# Patient Record
Sex: FEMALE | Race: WHITE | NOT HISPANIC OR LATINO | Employment: FULL TIME | URBAN - METROPOLITAN AREA
[De-identification: names, ages, dates, MRNs, and addresses within clinical notes are randomized per-mention and may not be internally consistent; named-entity substitution may affect disease eponyms.]

---

## 2017-04-19 ENCOUNTER — COMMUNICATION - HEALTHEAST (OUTPATIENT)
Dept: FAMILY MEDICINE | Facility: CLINIC | Age: 42
End: 2017-04-19

## 2017-04-20 ENCOUNTER — COMMUNICATION - HEALTHEAST (OUTPATIENT)
Dept: SCHEDULING | Facility: CLINIC | Age: 42
End: 2017-04-20

## 2017-04-21 ENCOUNTER — OFFICE VISIT - HEALTHEAST (OUTPATIENT)
Dept: FAMILY MEDICINE | Facility: CLINIC | Age: 42
End: 2017-04-21

## 2017-04-21 DIAGNOSIS — F41.9 ANXIETY: ICD-10-CM

## 2017-04-21 DIAGNOSIS — F41.1 ANXIETY STATE: ICD-10-CM

## 2017-04-21 DIAGNOSIS — G43.909 MIGRAINE: ICD-10-CM

## 2017-04-21 DIAGNOSIS — Z51.81 MEDICATION MONITORING ENCOUNTER: ICD-10-CM

## 2017-04-21 ASSESSMENT — MIFFLIN-ST. JEOR: SCORE: 1124.45

## 2017-04-23 ENCOUNTER — COMMUNICATION - HEALTHEAST (OUTPATIENT)
Dept: FAMILY MEDICINE | Facility: CLINIC | Age: 42
End: 2017-04-23

## 2017-07-20 ENCOUNTER — COMMUNICATION - HEALTHEAST (OUTPATIENT)
Dept: FAMILY MEDICINE | Facility: CLINIC | Age: 42
End: 2017-07-20

## 2017-07-20 DIAGNOSIS — G43.909 MIGRAINE: ICD-10-CM

## 2017-11-07 ENCOUNTER — COMMUNICATION - HEALTHEAST (OUTPATIENT)
Dept: FAMILY MEDICINE | Facility: CLINIC | Age: 42
End: 2017-11-07

## 2017-11-07 DIAGNOSIS — F41.9 ANXIETY: ICD-10-CM

## 2017-11-07 DIAGNOSIS — G43.909 MIGRAINE: ICD-10-CM

## 2018-06-22 ENCOUNTER — OFFICE VISIT - HEALTHEAST (OUTPATIENT)
Dept: FAMILY MEDICINE | Facility: CLINIC | Age: 43
End: 2018-06-22

## 2018-06-22 DIAGNOSIS — R19.7 DIARRHEA OF PRESUMED INFECTIOUS ORIGIN: ICD-10-CM

## 2018-06-22 LAB
ANION GAP SERPL CALCULATED.3IONS-SCNC: 11 MMOL/L (ref 5–18)
BASOPHILS # BLD AUTO: 0.1 THOU/UL (ref 0–0.2)
BASOPHILS NFR BLD AUTO: 1 % (ref 0–2)
BUN SERPL-MCNC: 9 MG/DL (ref 8–22)
CALCIUM SERPL-MCNC: 9 MG/DL (ref 8.5–10.5)
CHLORIDE BLD-SCNC: 106 MMOL/L (ref 98–107)
CO2 SERPL-SCNC: 22 MMOL/L (ref 22–31)
CREAT SERPL-MCNC: 0.73 MG/DL (ref 0.6–1.1)
EOSINOPHIL # BLD AUTO: 0.2 THOU/UL (ref 0–0.4)
EOSINOPHIL NFR BLD AUTO: 4 % (ref 0–6)
ERYTHROCYTE [DISTWIDTH] IN BLOOD BY AUTOMATED COUNT: 11.4 % (ref 11–14.5)
GFR SERPL CREATININE-BSD FRML MDRD: >60 ML/MIN/1.73M2
GLUCOSE BLD-MCNC: 90 MG/DL (ref 70–125)
HCT VFR BLD AUTO: 41.8 % (ref 35–47)
HGB BLD-MCNC: 14.1 G/DL (ref 12–16)
LYMPHOCYTES # BLD AUTO: 1.7 THOU/UL (ref 0.8–4.4)
LYMPHOCYTES NFR BLD AUTO: 37 % (ref 20–40)
MCH RBC QN AUTO: 32.6 PG (ref 27–34)
MCHC RBC AUTO-ENTMCNC: 33.7 G/DL (ref 32–36)
MCV RBC AUTO: 97 FL (ref 80–100)
MONOCYTES # BLD AUTO: 0.5 THOU/UL (ref 0–0.9)
MONOCYTES NFR BLD AUTO: 12 % (ref 2–10)
NEUTROPHILS # BLD AUTO: 2.1 THOU/UL (ref 2–7.7)
NEUTROPHILS NFR BLD AUTO: 47 % (ref 50–70)
PLATELET # BLD AUTO: 323 THOU/UL (ref 140–440)
PMV BLD AUTO: 6.8 FL (ref 7–10)
POTASSIUM BLD-SCNC: 3.8 MMOL/L (ref 3.5–5)
RBC # BLD AUTO: 4.32 MILL/UL (ref 3.8–5.4)
SODIUM SERPL-SCNC: 139 MMOL/L (ref 136–145)
WBC: 4.6 THOU/UL (ref 4–11)

## 2018-06-25 ENCOUNTER — AMBULATORY - HEALTHEAST (OUTPATIENT)
Dept: LAB | Facility: CLINIC | Age: 43
End: 2018-06-25

## 2018-06-25 DIAGNOSIS — R19.7 DIARRHEA OF PRESUMED INFECTIOUS ORIGIN: ICD-10-CM

## 2018-06-26 LAB
SHIGA TOXIN 1: NEGATIVE
SHIGA TOXIN 2: NEGATIVE

## 2018-06-27 ENCOUNTER — COMMUNICATION - HEALTHEAST (OUTPATIENT)
Dept: FAMILY MEDICINE | Facility: CLINIC | Age: 43
End: 2018-06-27

## 2018-06-28 LAB — BACTERIA SPEC CULT: NORMAL

## 2018-07-16 ENCOUNTER — COMMUNICATION - HEALTHEAST (OUTPATIENT)
Dept: FAMILY MEDICINE | Facility: CLINIC | Age: 43
End: 2018-07-16

## 2018-07-16 DIAGNOSIS — G43.909 MIGRAINE: ICD-10-CM

## 2018-09-21 ENCOUNTER — COMMUNICATION - HEALTHEAST (OUTPATIENT)
Dept: FAMILY MEDICINE | Facility: CLINIC | Age: 43
End: 2018-09-21

## 2018-09-21 DIAGNOSIS — G43.909 MIGRAINE: ICD-10-CM

## 2018-09-25 ENCOUNTER — OFFICE VISIT - HEALTHEAST (OUTPATIENT)
Dept: FAMILY MEDICINE | Facility: CLINIC | Age: 43
End: 2018-09-25

## 2018-09-25 DIAGNOSIS — Z12.31 VISIT FOR SCREENING MAMMOGRAM: ICD-10-CM

## 2018-09-25 DIAGNOSIS — L65.9 HAIR LOSS: ICD-10-CM

## 2018-09-25 DIAGNOSIS — G43.909 MIGRAINE: ICD-10-CM

## 2018-09-25 DIAGNOSIS — F41.9 ANXIETY: ICD-10-CM

## 2018-12-03 ENCOUNTER — HOSPITAL ENCOUNTER (OUTPATIENT)
Dept: MAMMOGRAPHY | Facility: CLINIC | Age: 43
Discharge: HOME | End: 2018-12-03
Attending: FAMILY MEDICINE

## 2018-12-03 DIAGNOSIS — Z12.31 VISIT FOR SCREENING MAMMOGRAM: ICD-10-CM

## 2019-01-09 ENCOUNTER — COMMUNICATION - HEALTHEAST (OUTPATIENT)
Dept: FAMILY MEDICINE | Facility: CLINIC | Age: 44
End: 2019-01-09

## 2019-03-18 ENCOUNTER — OFFICE VISIT - HEALTHEAST (OUTPATIENT)
Dept: FAMILY MEDICINE | Facility: CLINIC | Age: 44
End: 2019-03-18

## 2019-03-18 DIAGNOSIS — R05.9 COUGH: ICD-10-CM

## 2019-04-10 ENCOUNTER — COMMUNICATION - HEALTHEAST (OUTPATIENT)
Dept: FAMILY MEDICINE | Facility: CLINIC | Age: 44
End: 2019-04-10

## 2019-04-10 DIAGNOSIS — F41.9 ANXIETY: ICD-10-CM

## 2019-05-22 ENCOUNTER — COMMUNICATION - HEALTHEAST (OUTPATIENT)
Dept: FAMILY MEDICINE | Facility: CLINIC | Age: 44
End: 2019-05-22

## 2019-05-22 DIAGNOSIS — G43.909 MIGRAINE: ICD-10-CM

## 2019-07-03 ENCOUNTER — OFFICE VISIT - HEALTHEAST (OUTPATIENT)
Dept: FAMILY MEDICINE | Facility: CLINIC | Age: 44
End: 2019-07-03

## 2019-07-03 ENCOUNTER — COMMUNICATION - HEALTHEAST (OUTPATIENT)
Dept: FAMILY MEDICINE | Facility: CLINIC | Age: 44
End: 2019-07-03

## 2019-07-03 DIAGNOSIS — R39.9 URINARY SYMPTOM OR SIGN: ICD-10-CM

## 2019-07-03 DIAGNOSIS — G43.909 MIGRAINE: ICD-10-CM

## 2019-07-03 DIAGNOSIS — F41.9 ANXIETY: ICD-10-CM

## 2019-07-03 DIAGNOSIS — G43.909 MIGRAINE WITHOUT STATUS MIGRAINOSUS, NOT INTRACTABLE, UNSPECIFIED MIGRAINE TYPE: ICD-10-CM

## 2019-07-03 LAB
ALBUMIN UR-MCNC: NEGATIVE MG/DL
APPEARANCE UR: CLEAR
BACTERIA #/AREA URNS HPF: ABNORMAL HPF
BILIRUB UR QL STRIP: NEGATIVE
COLOR UR AUTO: YELLOW
GLUCOSE UR STRIP-MCNC: NEGATIVE MG/DL
HGB UR QL STRIP: ABNORMAL
KETONES UR STRIP-MCNC: NEGATIVE MG/DL
LEUKOCYTE ESTERASE UR QL STRIP: NEGATIVE
MUCOUS THREADS #/AREA URNS LPF: ABNORMAL LPF
NITRATE UR QL: NEGATIVE
PH UR STRIP: 6 [PH] (ref 5–8)
RBC #/AREA URNS AUTO: ABNORMAL HPF
SP GR UR STRIP: 1.02 (ref 1–1.03)
SQUAMOUS #/AREA URNS AUTO: ABNORMAL LPF
UROBILINOGEN UR STRIP-ACNC: ABNORMAL
WBC #/AREA URNS AUTO: ABNORMAL HPF

## 2019-07-03 RX ORDER — NICOTINE POLACRILEX 2 MG
GUM BUCCAL
Status: SHIPPED | COMMUNITY
Start: 2019-07-03

## 2019-07-03 RX ORDER — QUINIDINE SULFATE 200 MG
TABLET ORAL
Status: SHIPPED | COMMUNITY
Start: 2019-07-03 | End: 2024-09-09

## 2019-07-03 ASSESSMENT — MIFFLIN-ST. JEOR: SCORE: 1141.17

## 2019-07-04 LAB — BACTERIA SPEC CULT: NO GROWTH

## 2019-08-15 ENCOUNTER — COMMUNICATION - HEALTHEAST (OUTPATIENT)
Dept: FAMILY MEDICINE | Facility: CLINIC | Age: 44
End: 2019-08-15

## 2019-08-15 DIAGNOSIS — G43.909 MIGRAINE: ICD-10-CM

## 2019-08-29 ENCOUNTER — COMMUNICATION - HEALTHEAST (OUTPATIENT)
Dept: FAMILY MEDICINE | Facility: CLINIC | Age: 44
End: 2019-08-29

## 2019-08-29 DIAGNOSIS — G43.909 MIGRAINE: ICD-10-CM

## 2020-03-18 ENCOUNTER — COMMUNICATION - HEALTHEAST (OUTPATIENT)
Dept: FAMILY MEDICINE | Facility: CLINIC | Age: 45
End: 2020-03-18

## 2020-03-18 DIAGNOSIS — G43.909 MIGRAINE: ICD-10-CM

## 2020-03-23 ENCOUNTER — OFFICE VISIT - HEALTHEAST (OUTPATIENT)
Dept: FAMILY MEDICINE | Facility: CLINIC | Age: 45
End: 2020-03-23

## 2020-03-23 DIAGNOSIS — F41.9 ANXIETY: ICD-10-CM

## 2020-03-23 DIAGNOSIS — G43.909 MIGRAINE: ICD-10-CM

## 2020-08-27 ENCOUNTER — COMMUNICATION - HEALTHEAST (OUTPATIENT)
Dept: FAMILY MEDICINE | Facility: CLINIC | Age: 45
End: 2020-08-27

## 2020-08-27 DIAGNOSIS — G43.909 MIGRAINE: ICD-10-CM

## 2020-08-27 DIAGNOSIS — F41.9 ANXIETY: ICD-10-CM

## 2020-09-23 ENCOUNTER — COMMUNICATION - HEALTHEAST (OUTPATIENT)
Dept: FAMILY MEDICINE | Facility: CLINIC | Age: 45
End: 2020-09-23

## 2020-09-23 DIAGNOSIS — K64.4 EXTERNAL HEMORRHOIDS: ICD-10-CM

## 2020-09-29 ENCOUNTER — OFFICE VISIT - HEALTHEAST (OUTPATIENT)
Dept: FAMILY MEDICINE | Facility: CLINIC | Age: 45
End: 2020-09-29

## 2020-09-29 DIAGNOSIS — G43.909 MIGRAINE: ICD-10-CM

## 2020-09-29 DIAGNOSIS — F41.9 ANXIETY: ICD-10-CM

## 2020-09-29 RX ORDER — ALPRAZOLAM 0.5 MG
0.5 TABLET ORAL 3 TIMES DAILY PRN
Qty: 20 TABLET | Refills: 1 | Status: SHIPPED | OUTPATIENT
Start: 2020-09-29 | End: 2022-11-09

## 2021-01-12 ENCOUNTER — RECORDS - HEALTHEAST (OUTPATIENT)
Dept: ADMINISTRATIVE | Facility: OTHER | Age: 46
End: 2021-01-12

## 2021-02-02 ENCOUNTER — OFFICE VISIT - HEALTHEAST (OUTPATIENT)
Dept: FAMILY MEDICINE | Facility: CLINIC | Age: 46
End: 2021-02-02

## 2021-02-02 DIAGNOSIS — Z01.818 PREOP EXAMINATION: ICD-10-CM

## 2021-02-02 DIAGNOSIS — K62.89 HYPERTROPHIED ANAL PAPILLA: ICD-10-CM

## 2021-02-02 DIAGNOSIS — K59.00 CONSTIPATION, UNSPECIFIED CONSTIPATION TYPE: ICD-10-CM

## 2021-02-02 DIAGNOSIS — F41.1 ANXIETY STATE: ICD-10-CM

## 2021-02-02 DIAGNOSIS — G43.909 MIGRAINE: ICD-10-CM

## 2021-02-02 ASSESSMENT — MIFFLIN-ST. JEOR: SCORE: 1211.76

## 2021-02-03 ENCOUNTER — COMMUNICATION - HEALTHEAST (OUTPATIENT)
Dept: FAMILY MEDICINE | Facility: CLINIC | Age: 46
End: 2021-02-03

## 2021-02-03 ENCOUNTER — RECORDS - HEALTHEAST (OUTPATIENT)
Dept: ADMINISTRATIVE | Facility: OTHER | Age: 46
End: 2021-02-03

## 2021-02-03 LAB
SARS-COV-2 PCR COMMENT: NORMAL
SARS-COV-2 RNA SPEC QL NAA+PROBE: NEGATIVE
SARS-COV-2 VIRUS SPECIMEN SOURCE: NORMAL

## 2021-02-03 RX ORDER — BUTALBITAL, ACETAMINOPHEN AND CAFFEINE 50; 325; 40 MG/1; MG/1; MG/1
1-2 TABLET ORAL EVERY 6 HOURS PRN
Qty: 30 TABLET | Refills: 2 | Status: SHIPPED | OUTPATIENT
Start: 2021-02-03 | End: 2022-11-09

## 2021-02-03 RX ORDER — BUTALBITAL, ASPIRIN, AND CAFFEINE 325; 50; 40 MG/1; MG/1; MG/1
1-2 CAPSULE ORAL EVERY 6 HOURS PRN
Qty: 30 CAPSULE | Refills: 2 | Status: SHIPPED | OUTPATIENT
Start: 2021-02-03 | End: 2021-10-29

## 2021-02-04 ENCOUNTER — COMMUNICATION - HEALTHEAST (OUTPATIENT)
Dept: SCHEDULING | Facility: CLINIC | Age: 46
End: 2021-02-04

## 2021-02-19 ENCOUNTER — RECORDS - HEALTHEAST (OUTPATIENT)
Dept: ADMINISTRATIVE | Facility: OTHER | Age: 46
End: 2021-02-19

## 2021-04-26 ENCOUNTER — COMMUNICATION - HEALTHEAST (OUTPATIENT)
Dept: FAMILY MEDICINE | Facility: CLINIC | Age: 46
End: 2021-04-26

## 2021-04-26 DIAGNOSIS — G43.909 MIGRAINE WITHOUT STATUS MIGRAINOSUS, NOT INTRACTABLE, UNSPECIFIED MIGRAINE TYPE: ICD-10-CM

## 2021-04-26 RX ORDER — RIZATRIPTAN BENZOATE 10 MG/1
5-10 TABLET ORAL PRN
Qty: 12 TABLET | Refills: 5 | Status: SHIPPED | OUTPATIENT
Start: 2021-04-26 | End: 2024-09-09

## 2021-05-04 ENCOUNTER — OFFICE VISIT - HEALTHEAST (OUTPATIENT)
Dept: FAMILY MEDICINE | Facility: CLINIC | Age: 46
End: 2021-05-04

## 2021-05-04 DIAGNOSIS — J01.10 ACUTE NON-RECURRENT FRONTAL SINUSITIS: ICD-10-CM

## 2021-05-04 RX ORDER — LORATADINE 10 MG/1
10 TABLET ORAL DAILY
Qty: 30 TABLET | Refills: 2 | Status: SHIPPED | OUTPATIENT
Start: 2021-05-04

## 2021-05-27 NOTE — TELEPHONE ENCOUNTER
Controlled Substance Refill Request  Medication:   Requested Prescriptions     Pending Prescriptions Disp Refills     ALPRAZolam (XANAX) 0.5 MG tablet [Pharmacy Med Name: ALPRAZOLAM 0.5MG TABLETS] 20 tablet 0     Sig: TAKE 1 TO 2 TABLETS BY MOUTH EVERY 8 HOURS AS NEEDED FOR ANXIETY     Date Last Fill: 9/25/18  Pharmacy: [id number]   Submit electronically to pharmacy  Controlled Substance Agreement on File:   Encounter-Level CSA Scan Date:    There are no encounter-level csa scan date.       Last office visit: Last office visit pertaining to requested medication was 3/18/19

## 2021-05-29 NOTE — TELEPHONE ENCOUNTER
Controlled Substance Refill Request  Medication Name:   Requested Prescriptions     Pending Prescriptions Disp Refills     butalbital-acetaminophen-caffeine (FIORICET, ESGIC) -40 mg per tablet 30 tablet 1     Sig: Take 1-2 tablets by mouth every 6 (six) hours as needed for pain.     Date Last Fill: 9/25/18  Pharmacy: [facility]      Submit electronically to pharmacy  Controlled Substance Agreement Date Scanned:   Encounter-Level CSA Scan Date:    There are no encounter-level csa scan date.       Last office visit with prescriber/PCP: 9/25/2018 Rachel Rojo MD OR same dept: 9/25/2018 Rachel Rojo MD OR same specialty: 3/18/2019 Oksana Matamoros MD  Last physical: Visit date not found Last MTM visit: Visit date not found

## 2021-05-30 VITALS — WEIGHT: 112.31 LBS | BODY MASS INDEX: 19.17 KG/M2 | HEIGHT: 64 IN

## 2021-05-30 NOTE — TELEPHONE ENCOUNTER
----- Message from Ange Bermudez CMA sent at 7/5/2019 12:38 PM CDT -----  Pt notified of results. She states that she is still having frequency. Has only taken 1.5 pills. She is wondering if there is a liquid form of this medication.    Ange Bermudez CMA

## 2021-05-30 NOTE — TELEPHONE ENCOUNTER
Controlled Substance Refill Request  Medication Name:   Requested Prescriptions     Pending Prescriptions Disp Refills     BUTALBITAL COMPOUND W/CODEINE capsule [Pharmacy Med Name: BUTALB/ASPIRIN/CAFF/CODEINE 30MG CP] 30 capsule 0     Sig: TAKE 1 CAPSULE BY MOUTH EVERY 4 HOURS AS NEEDED FOR PAIN     Date Last Fill: 2018  Pharmacy: [facility] [place]     Submit electronically to pharmacy  Controlled Substance Agreement Date Scanned:   Encounter-Level CSA Scan Date:    There are no encounter-level csa scan date.       Last office visit with prescriber/PCP: 2018 Rachel Rojo MD OR same dept: 7/3/2019 Danielito Monzon CNP OR same specialty: 7/3/2019 Danielito Monzon CNP  Last physical: Visit date not found Last MTM visit: Visit date not found      Patient is questioning if this request can be expedited, as she will be going out of town. Patient states the prescription the pharmacy had on file . Patient requesting Danielito Monzon CNP address the refill request, due to seeing him in clinic today 2019.

## 2021-05-30 NOTE — TELEPHONE ENCOUNTER
Left message to call back for: Pt.   Information to relay to patient:  Information below.     Ange Bermudez, CMA

## 2021-05-30 NOTE — TELEPHONE ENCOUNTER
There's not a readily available liquid option available, so let's do this.    Since there isn't evidence for an infection, she may have urethritis with some irritation causing her symptoms, but it's not a bacterial source.    Let's sit through the weekend and see if things calm down. If not, we can try a medicine for overactive bladder if it persists.    Danielito Monzon, CNP

## 2021-05-30 NOTE — TELEPHONE ENCOUNTER
Controlled Substance Refill Request  Medication:   Requested Prescriptions     Pending Prescriptions Disp Refills     BUTALBITAL COMPOUND W/CODEINE capsule [Pharmacy Med Name: BUTALB/ASPIRIN/CAFF/CODEINE 30MG CP] 30 capsule 0     Sig: TAKE 1 CAPSULE BY MOUTH EVERY 4 HOURS AS NEEDED FOR PAIN     Date Last Fill: 5/22/2019  (#30 plus 1 refill)  Pharmacy: [id number]   Submit electronically to pharmacy  Controlled Substance Agreement on File:   No  Encounter-Level CSA Scan Date:    There are no encounter-level csa scan date.       Last office visit: Last office visit pertaining to requested medication was 7/3/2019.

## 2021-05-30 NOTE — PROGRESS NOTES
Chief Complaint   Patient presents with     Medication Management     Migraine     Will be going out of town and would like [facility] for this. She still has Fioricet on hand but states that these do not work as well as the aspirin based medications. Last time she had the aspirin based medication was about 1-2 months ago.      Urinary Frequency     This started 3 days ago.        HPI: Patient presents today with urinary symptoms and also for medication check.    For the past 3 days she has been experiencing urinary frequency along with a little bit of discomfort with urination.  No vaginal discharge, abdominal pain, back/flank pain, fever, chills, STD risk.  She says that there is no risk of her being pregnant.  Her last menses was on 6/20/2019.  She does not get frequent urinary tract infections.  She is sexually active. No gross hematuria    Patient has been dealing with worsening anxiety symptoms over the past couple of months following the death of her mother.  She reports that her mother was sick for quite some time, but is still been hard for her.  She sometimes finds that she will get panicky when she thinks about it.  No real big depressive issues.  She has a trip up north planned with friends to a cabin and is looking forward to this.  She has been leaning on the support of friends through this difficult time.  She has used alprazolam somewhat sparingly for anxiety and panic symptoms and requests a refill today.    PHQ-9 Total Score: 4 (7/3/2019 12:00 PM)  HILDA 7 Total Score: 11 (7/3/2019 12:00 PM)    Patient has a history of frequent migraines and there is been some communication issues with her pharmacy.  She says that she has not received a prescription for the Fioricet that was sent to the pharmacy by Dr. Rojo on 5/22/2019.  We called the pharmacy and they report that they received a prescription but she never picked it up.  Patient also alternates this with Fiorinal.      ROS:Review of  "Systems - History obtained from the patient  General ROS: negative  Psychological ROS: positive for - anxiety  Hematological and Lymphatic ROS: negative  Respiratory ROS: negative  Cardiovascular ROS: negative  Gastrointestinal ROS: negative  Genito-Urinary ROS: positive for - urinary frequency/urgency  Neurological ROS: positive for - headaches    SH: The Patient's  reports that she has never smoked. She has never used smokeless tobacco.      FH: The Patient's family history includes Breast cancer (age of onset: 70) in her paternal aunt.     Meds:    Current Outpatient Medications on File Prior to Visit   Medication Sig Dispense Refill     butalbital-acetaminophen-caffeine (FIORICET, ESGIC) -40 mg per tablet Take 1-2 tablets by mouth every 6 (six) hours as needed for pain. 30 tablet 1     CYANOCOBALAMIN, VITAMIN B-12, ORAL Take by mouth.       MAGNESIUM ORAL Take by mouth.       mv-mn/iron/folic acid/herb 190 (VITAMIN D3 COMPLETE ORAL) Take by mouth.       [DISCONTINUED] ALPRAZolam (XANAX) 0.5 MG tablet TAKE 1 TO 2 TABLETS BY MOUTH EVERY 8 HOURS AS NEEDED FOR ANXIETY 20 tablet 0     biotin 1 mg cap Take by mouth.       codeine-butalbital-ASA-caff (BUTALBITAL COMPOUND W/CODEINE) capsule Take 1 capsule by mouth every 4 (four) hours as needed for pain. 30 capsule 1     [DISCONTINUED] azithromycin (ZITHROMAX) 250 MG tablet Take 500 mg (2 x 250 mg tablets) on day 1 followed by 250 mg (1 tablet) on days 2-5. 6 tablet 0     [DISCONTINUED] codeine-guaiFENesin (GUAIFENESIN AC)  mg/5 mL liquid Take 10 mL by mouth at bedtime as needed for cough. 240 mL 0     No current facility-administered medications on file prior to visit.        O:  /80   Pulse 73   Temp 97.9  F (36.6  C) (Oral)   Ht 5' 4\" (1.626 m)   Wt 116 lb (52.6 kg)   LMP 06/20/2019   SpO2 99%   BMI 19.91 kg/m      Physical Examination:   General appearance - alert, well appearing, and in no distress  Mental status - alert, oriented to " person, place, and time  Eyes - pupils equal and reactive, extraocular eye movements intact  Neck - supple, no significant adenopathy  Lymphatics - no palpable lymphadenopathy, no hepatosplenomegaly  Chest - clear to auscultation, no wheezes, rales or rhonchi, symmetric air entry  Heart - normal rate and regular rhythm, S1 and S2 normal, no murmurs noted  Abdomen - soft, nontender, nondistended, no masses or organomegaly  Neurological - alert, oriented, normal speech, no focal findings or movement disorder noted, neck supple without rigidity, cranial nerves II through XII intact, motor and sensory grossly normal bilaterally, normal muscle tone, no tremors, strength 5/5  Skin - normal coloration and turgor, no rashes, no suspicious skin lesions noted      A/P:     Problem List Items Addressed This Visit        ENT/CARD/PULM/ENDO Problems    Migraine Headache      Other Visit Diagnoses     Urinary symptom or sign    -  Primary    Relevant Medications    sulfamethoxazole-trimethoprim (BACTRIM DS) 800-160 mg per tablet    fluconazole (DIFLUCAN) 150 MG tablet    Other Relevant Orders    Urinalysis (Completed)    Culture, Urine    Anxiety        Relevant Medications    ALPRAZolam (XANAX) 0.5 MG tablet            1. Urinary symptom or sign  Urine has some abnormalities including a moderate amount of microscopic blood and few bacteria.  There is also some squamous epithelial cells so there is possible contamination.  Given symptoms, the fact that we are entering the holiday weekend, and the fact that she is can be heading up north, start Bactrim.  She says that she will oftentimes get yeast infections and so a prescription for fluconazole sent as well to use afterwards if she develops any of these.    - Urinalysis  - Culture, Urine  - sulfamethoxazole-trimethoprim (BACTRIM DS) 800-160 mg per tablet; Take 1 tablet by mouth 2 (two) times a day for 7 days.  Dispense: 14 tablet; Refill: 0  - fluconazole (DIFLUCAN) 150 MG  tablet; Take 1 tablet (150 mg total) by mouth once for 1 dose.  Dispense: 1 tablet; Refill: 0    2. Anxiety  Refill sent to the pharmacy of alprazolam.  Reminded no alcohol or driving after taking these.    - ALPRAZolam (XANAX) 0.5 MG tablet; TAKE 1 TO 2 TABLETS BY MOUTH EVERY 8 HOURS AS NEEDED FOR ANXIETY  Dispense: 20 tablet; Refill: 0    3. Migraine without status migrainosus, not intractable, unspecified migraine type  Stable.  Patient informed that the prescription that was requested back in May by the pharmacy was sent and they should have it ready to go for her.        Danielito Monzon, CNP

## 2021-05-31 NOTE — TELEPHONE ENCOUNTER
Controlled Substance Refill Request  Medication Name:   Requested Prescriptions     Pending Prescriptions Disp Refills     codeine-butalbital-ASA-caff (BUTALBITAL COMPOUND W/CODEINE) capsule 10 capsule 0     Date Last Fill: 7/4/2019  Pharmacy: [facility] [place].      Submit electronically to pharmacy  Controlled Substance Agreement Date Scanned:   Encounter-Level CSA Scan Date:    There are no encounter-level csa scan date.       Last office visit with prescriber/PCP: 9/25/2018 Rachel Rojo MD OR same dept: 7/3/2019 Danielito Monzon CNP OR same specialty: 7/3/2019 Danielito Monzon CNP  Last physical: Visit date not found Last MTM visit: Visit date not found

## 2021-05-31 NOTE — TELEPHONE ENCOUNTER
Controlled Substance Refill Request  Medication Name:   Requested Prescriptions     Pending Prescriptions Disp Refills     butalbital-acetaminophen-caffeine (FIORICET, ESGIC) -40 mg per tablet 30 tablet 1     Sig: Take 1-2 tablets by mouth every 6 (six) hours as needed for pain.     Date Last Fill: unknown  Pharmacy: [facility]      Submit electronically to pharmacy  Controlled Substance Agreement Date Scanned:   Encounter-Level CSA Scan Date:    There are no encounter-level csa scan date.       Last office visit with prescriber/PCP: 9/25/2018 Rachel Rojo MD OR same dept: 7/3/2019 Danielito Monzon CNP OR same specialty: 7/3/2019 Danielito Monzon CNP  Last physical: Visit date not found Last MTM visit: Visit date not found

## 2021-06-01 VITALS — BODY MASS INDEX: 19.5 KG/M2 | WEIGHT: 113.6 LBS

## 2021-06-02 ENCOUNTER — RECORDS - HEALTHEAST (OUTPATIENT)
Dept: ADMINISTRATIVE | Facility: CLINIC | Age: 46
End: 2021-06-02

## 2021-06-02 VITALS — WEIGHT: 117 LBS | BODY MASS INDEX: 20.08 KG/M2

## 2021-06-02 VITALS — WEIGHT: 113.56 LBS | BODY MASS INDEX: 19.49 KG/M2

## 2021-06-03 VITALS — BODY MASS INDEX: 19.81 KG/M2 | WEIGHT: 116 LBS | HEIGHT: 64 IN

## 2021-06-05 ENCOUNTER — RECORDS - HEALTHEAST (OUTPATIENT)
Dept: SCHEDULING | Facility: CLINIC | Age: 46
End: 2021-06-05

## 2021-06-05 VITALS
HEIGHT: 64 IN | SYSTOLIC BLOOD PRESSURE: 130 MMHG | BODY MASS INDEX: 22.46 KG/M2 | WEIGHT: 131.56 LBS | HEART RATE: 83 BPM | DIASTOLIC BLOOD PRESSURE: 62 MMHG | OXYGEN SATURATION: 98 %

## 2021-06-05 DIAGNOSIS — N64.59 OTHER SIGN AND SYMPTOM IN BREAST: ICD-10-CM

## 2021-06-05 DIAGNOSIS — N63.0 LUMP OR MASS IN BREAST: ICD-10-CM

## 2021-06-07 NOTE — TELEPHONE ENCOUNTER
Patient would like Alprazolam filled, also butalbital with acetaminophen and caffine, and she wants more filled than just the 15 on the butalbital with codeine. She is scheduled for a phone visit at 4:30.

## 2021-06-07 NOTE — PROGRESS NOTES
"Gaviota Granado is a 45 y.o. female who is being evaluated via a billable telephone visit.      The patient has been notified of following:     \"This telephone visit will be conducted via a call between you and your physician/provider. We have found that certain health care needs can be provided without the need for a physical exam.  This service lets us provide the care you need with a short phone conversation.  If a prescription is necessary we can send it directly to your pharmacy.  If lab work is needed we can place an order for that and you can then stop by our lab to have the test done at a later time.    If during the course of the call the physician/provider feels a telephone visit is not appropriate, you will not be charged for this service.\"         Gaviota Granado complains of    Chief Complaint   Patient presents with     Medication Management     Patient would like to review current medications.      Patient was contacted for follow up of migraine headaches. She reports 2-5 migraines monthly, usually the week before her period, just before and during period, and then during ovulation. She has has had them since age 21 and they do seem to be improving over time, less painful. Cycles are still regular. She has been alternating between fiorinal with codeine and fioricet. She notes that she gets bruising if takes the ASA one too freq, then will get constipated with tylenol-based one too frequently. She has been on maxalt in the past, but had some unpleasant side effects with that after a while.        She also takes alprazolam prn for anxiety, and often for flying. She does report some ongoing stresses with recent divorce and financial pressures. Her mother passed away last spring, and she is doing ok following that.      CSA - will mail and have her mail back      I have reviewed and updated the patient's Past Medical History, Social History, Family History and Medication List.    ALLERGIES  Patient has no " known allergies.    Additional provider notes:   Patient needed a refill on the following medications - Alprazolam, butalbital-acetaminophen-caffeine and codeine-butalbital-ASA-caff.   Patient only received 15 capules of the codeine-butalbital-ASA-caff when she picked up the medication yesterday. Wondering if she can receive the other 15 as she typically has gotten 30 in the past. Per the patient these capsules work better than the butalbital-acetaminophen-caffeine, therefore wanting to make sure she has them on hand.     Assessment/Plan:  1. Migraine  butalbital-acetaminophen-caffeine (FIORICET, ESGIC) -40 mg per tablet    codeine-butalbital-ASA-caff (BUTALBITAL COMPOUND W/CODEINE) capsule   2. Anxiety  ALPRAZolam (XANAX) 0.5 MG tablet      We reviewed treatment options for her migraines and she would like to continue the same regimen, alternating between fiorcet and fiorinal with codeine, and she alternates between the two due to side effects. She will also continue the alprazolam prn. We discussed the potential for abuse or harm from misuse from the fiorcet, fiorinal with codeine and alprazolam, especially in combination, and we will print out a CSA today and mail it to her. She will return it by mail or drop it off at the clinic. We discussed the importance of tapering off the medication slowly and discussed that there is a possibility that the butalbital-based medications may eventually be discontinued. She expressed understanding. We also discussed the need for at least every 6 mos visits due to the current climate around controlled substances. We discussed some of the other options for HA prevention including some of the newer injectable medications and she will do some research and consider those. She should follow up in 4-6 mos for medication check.     Phone call duration:  15 minutes    Flora Bo CMA

## 2021-06-07 NOTE — TELEPHONE ENCOUNTER
Controlled Substance Refill Request  Medication Name:   Requested Prescriptions     Pending Prescriptions Disp Refills     codeine-butalbital-ASA-caff (BUTALBITAL COMPOUND W/CODEINE) capsule [Pharmacy Med Name: BUTALB/ASPIRIN/CAFF/CODEINE 30MG CP] 30 capsule 0     Sig: TAKE 1 CAPSULE BY MOUTH EVERY 4 HOURS AS NEEDED FOR PAIN     Date Last Fill: 8/29/19  Requested Pharm[facility]: [facility]  Submit electronically to pharmacy  Controlled Substance Agreement on file:   Encounter-Level CSA Scan Date:    There are no encounter-level csa scan date.        Last office visit:  7/3/19

## 2021-06-10 NOTE — PROGRESS NOTES
OV  4/21/2017  Assessment:        1. Anxiety  Thyroid Stimulating Hormone (TSH)   2. Migraine  butalbital-acetaminophen-caffeine (FIORICET, ESGIC) -40 mg per tablet   3. Anxiety  ALPRAZolam (XANAX) 0.5 MG tablet   4. Medication monitoring encounter  Basic Metabolic Panel    HM2(CBC w/o Differential)        Plan:        We reviewed the etiology and natural history for depression/anxiety and options for treatment. We elected to begin escitalopram 5 mg daily, and we discussed dose titration. I also renewed her alprazolam at 0.5 mg tid prn, and she understands the potential for dependence and rebound symptoms if uses too frequently, etc. We reviewed the ideal length of treatment for SSRIs and the potential side effects, need to taper the medications gradually, and indications for urgent evaluation. She will let me know if she has any significant difficulties. I also sent a new Rx for her fiorcet, and we will see if they can supply the specific  that she has done best with.  She should f/u in 2-3 mos for med check.      Subjective:           Gaviota VaughanBartoloVannesa is a 42 y.o. female who presents for follow up of anxiety disorder. She has the following anxiety symptoms: feelings of losing control, panic attacks and psychomotor agitation. Onset of symptoms was several years ago, and they have been rapidly worsening over the last few weeks. Current symptoms include depressed mood, anhedonia, insomnia, fatigue, feelings of worthlessness/guilt and hopelessness and feelings of losing control, racing thoughts, chronic worry and inability to relax. She reports panic attacks and difficulty driving. Patient denies difficulty concentrating and recurrent thoughts of death. She denies current suicidal and homicidal ideation.      Risk factors: negative life event divorce, daughter going to college, recent move. Previous treatment includes Xanax prn, infrequent use. She complains of the following medication side  "effects: none.    Little interest or pleasure in doing things: More than half the days  Feeling down, depressed, or hopeless: More than half the days  Trouble falling or staying asleep, or sleeping too much: More than half the days  Feeling tired or having little energy: More than half the days  Poor appetite or overeating: More than half the days  Feeling bad about yourself - or that you are a failure or have let yourself or your family down: Nearly every day  Trouble concentrating on things, such as reading the newspaper or watching television: Several days  Moving or speaking so slowly that other people could have noticed. Or the opposite - being so fidgety or restless that you have been moving around a lot more than usual: Several days  Thoughts that you would be better off dead, or of hurting yourself in some way: Not at all  PHQ-9 Total Score: 15  If you checked off any problems, how difficult have these problems made it for you to do your work, take care of things at home, or get along with other people?: Very difficult     HILDA-7 4/21/2017   Feeling nervous, anxious or on edge 3   Not being able to stop or control worry 3   Worrying too much about different things 3   Trouble relaxing 2   Becoming easily annnoyed or irritable 3   Feeling afraid as if something awful might happen 2       The following portions of the patient's history were reviewed and updated as appropriate: allergies, current medications, past family history, past medical history, past social history, past surgical history and problem list.    Review of Systems  Pertinent items are noted in HPI.      Objective:     /58  Pulse 64  Ht 5' 4\" (1.626 m)  Wt 112 lb 5 oz (50.9 kg)  BMI 19.28 kg/m2  Physical Exam:  GEN: Alert and oriented, NAD,  well nourished  SKIN:  Normal skin turgor, no lesions/rashes   HEENT: moist mucous membranes, no rhinorrhea.    NECK: Normal.  No adenopathy or thyromegaly.  CV: Regular rate and rhythm, no " murmurs.   LUNGS: Clear to auscultation bilaterally.    ABDOMEN: Soft, non-tender, non-distended, no masses   EXTREMITY: No edema, cyanosis  NEURO: Grossly normal.     Mental Status Examination:  Dress, grooming, personal hygiene: Appropriate  Speech: Appropriate  Mood: Appropriate

## 2021-06-11 NOTE — TELEPHONE ENCOUNTER
Controlled Substance Refill Request  Medication Name:   Requested Prescriptions     Pending Prescriptions Disp Refills     codeine-butalbital-ASA-caff (BUTALBITAL COMPOUND W/CODEINE) capsule [Pharmacy Med Name: BUTALB/ASPIRIN/CAFF/CODEINE 30MG CP] 30 capsule 0     Sig: TAKE 1 CAPSULE BY MOUTH EVERY 4 HOURS AS NEEDED FOR PAIN     butalbital-acetaminophen-caffeine (FIORICET, ESGIC) -40 mg per tablet [Pharmacy Med Name: BUTALBITAL/ACETAMINOPHEN/CAFF TABS] 30 tablet 0     Sig: TAKE 1 TO 2 TABLETS BY MOUTH EVERY 6 HOURS AS NEEDED FOR PAIN     ALPRAZolam (XANAX) 0.5 MG tablet [Pharmacy Med Name: ALPRAZOLAM 0.5MG TABLETS] 20 tablet 0     Sig: TAKE 1 TO 2 TABLETS BY MOUTH EVERY 8 HOURS AS NEEDED FOR ANXIETY     Date Last Fill: 3/23/20  [facility]ed [facility]: [facility]  Submit electronically to pharmacy  Controlled Substance Agreement on file:   Encounter-Level CSA Scan Date:    There are no encounter-level csa scan date.        Last office visit:  3/23/20

## 2021-06-11 NOTE — PROGRESS NOTES
"Gaviota Granado is a 45 y.o. female who is being evaluated via a billable telephone visit.      The patient has been notified of following:     \"This telephone visit will be conducted via a call between you and your physician/provider. We have found that certain health care needs can be provided without the need for a physical exam.  This service lets us provide the care you need with a short phone conversation.  If a prescription is necessary we can send it directly to your pharmacy.  If lab work is needed we can place an order for that and you can then stop by our lab to have the test done at a later time.    Telephone visits are billed at different rates depending on your insurance coverage. During this emergency period, for some insurers they may be billed the same as an in-person visit.  Please reach out to your insurance provider with any questions.    If during the course of the call the physician/provider feels a telephone visit is not appropriate, you will not be charged for this service.\"    Patient has given verbal consent to a Telephone visit? Yes    What phone number would you like to be contacted at? [phone]    Patient would like to receive their AVS by AVS Preference: Mail a copy.    Additional provider notes:       Gaviota Granado is a 45 y.o. female who presents for follow-up of migraine headaches. Headaches are occurring 1-2x per week at most, often less than 1 per week. Generally, the headaches last about a few hours. Home treatment has included fioricet and Fiorinal with codeine with marked improvement. She alternates between butalbital-asa-codeine and butalbital-acetaminophen-caffeine since she gets significant bruising with too much of the aspirin and constipation with too much of the tylenol. She has been alternating between the two with good results for several years. She is ok with dropping the codeine if that simplifies things.      She also has a history of anxiety and takes " alprazolam prn. She typically takes 2-3 tabs per week, but she reports increased use this spring after her mother passed away and with the stress with COVID.         Assessment/Plan:  1. Migraine  butalbital-aspirin-caffeine (FIORINAL) -40 mg per capsule    butalbital-acetaminophen-caffeine (FIORICET, ESGIC) -40 mg per tablet   2. Anxiety  ALPRAZolam (XANAX) 0.5 MG tablet         We reviewed the treatment options for her migraine headaches, and we will change to fiorinal (in place of fiorinal with codeine) and fioricet alternating. We discussed the possibility that these medications are phenobarbital-based, and may be taken off the market at some point. We discussed the potential for abuse or harm from misuse for the fiorinal, fioricet and alprazolam, and we had her sign a new treatment agreement today. She needs to be seen at least every 6 mos for monitoring of these medications. As far as her anxiety, she will continue alprazolam prn, and we reviewed the potential risks associated with overuse. She will otherwise f/u in 4-6 mos for routine med check/evaluation, sooner if any additional concerns.         Phone call duration:  15 minutes    Rachel Rojo MD

## 2021-06-11 NOTE — TELEPHONE ENCOUNTER
Referral place. She can schedule by calling:  [facility]  PHONE: [phone]

## 2021-06-11 NOTE — TELEPHONE ENCOUNTER
Called pt per recall and set up virtual appt for that.    She stated she is having a really bad time with hemorrhoids right now and is wanting a referral to a colon and rectal [facility].  Stated she was told before by her OBGYN where to go but cannot rememeber and wants to be sure she is referred for insurance purposes.      Advised would send message to primary and it can get sent on to specialty  for referral and scheduling.

## 2021-06-14 NOTE — PROGRESS NOTES
[facility]  [address]  [place] [address]  Dept: [phone]  Dept Fax: [phone]  Primary Provider: Rachel Rojo MD  Pre-op Performing Provider: RACHEL ROJO    PREOPERATIVE EVALUATION:  Today's date: 2/2/2021    Gaviota Granado is a 45 y.o. female who presents for a preoperative evaluation.    Surgical Information:  Surgery/Procedure: Colonoscopy and Excision of Hypertrophied Anal Papillae   Surgery Location: [facility]  Surgeon: Dr. Mulligan  Surgery Date: 2/5/2021  Time of Surgery: 11 am  Where patient plans to recover: At home with family  Fax number for surgical facility: [phone]    Type of Anesthesia Anticipated: General    Subjective     HPI related to upcoming procedure:          Gaviota Granado is a 45 y.o. female who presents for Preoperative evaluation prior to undergoing colonoscopy and excision of a hypertrophied anal papilla. She has had prolapse of this tissue recurrently for the last few years, but it has gotten more bothersome and requires manual reduction frequently. She does have a chronic history of constipation.         She denies recent fever, chills, cough, URI symptoms, dyspnea, abdominal pain, nausea, vomiting or diarrhea.           She has migraine headaches chronically, typically around her menstrual cycle, and takes fiorinal and fioricet alternating, and occasionally needs fiorinal with codeine, although that does cause some nausea. She needs to complete a new CSA at this time. She also has some anxiety history and takes alprazolam as needed, typically 1-2x weekly. She denies any significant side effects.        Preop Questions 2/2/2021   Have you ever had a heart attack or stroke? No   Have you ever had surgery on your heart or blood vessels, such as a stent placement, a coronary artery bypass, or surgery on an artery in your head, neck, heart, or legs? No   Do you  have chest pain with activity? No   Do you have a history of  heart failure? No   Do you currently have a cold, bronchitis or symptoms of other infection? No   Do you have a cough, shortness of breath, or wheezing? No   Do you or anyone in your family have previous history of blood clots? No   Do you or does anyone in your family have a serious bleeding problem such as prolonged bleeding following surgeries or cuts? YES - mother and aunt have bleeding issues   Have you ever had problems with anemia or been told to take iron pills? No   Have you had any abnormal blood loss such as black, tarry or bloody stools, or abnormal vaginal bleeding? No   Have you ever had a blood transfusion? YES - after childbirth, had postpartum hemorrhage.   Have you ever had a transfusion reaction? No   Are you willing to have a blood transfusion if it is medically needed before, during, or after your surgery? NO    Have you or any of your relatives ever had problems with anesthesia? No   Do you have sleep apnea, excessive snoring or daytime drowsiness? No   Do you have any artifical heart valves or other implanted medical devices like a pacemaker, defibrillator, or continuous glucose monitor? No   Do you have artificial joints? No   Are you allergic to latex? No   Is there any chance that you may be pregnant? No     Health Care Directive:  Patient does not have a Health Care Directive or Living Will: Discussed advance care planning with patient; information given to patient to review.    Preoperative Review of :    reviewed - controlled substances reflected in medication list.      See problem list for active medical problems.  Problems all longstanding and stable, except as noted/documented.  See ROS for pertinent symptoms related to these conditions.      Review of Systems  Constitutional, neuro, ENT, endocrine, pulmonary, cardiac, gastrointestinal, genitourinary, musculoskeletal, integument and psychiatric systems are negative,  "except as otherwise noted.      Patient Active Problem List    Diagnosis Date Noted     Seasonal allergies 04/29/2016     Anxiety      Migraine Headache      Constipation      Past Medical History:   Diagnosis Date     Breast cyst 2014     Past Surgical History:   Procedure Laterality Date     BREAST CYST ASPIRATION  2014     Current Outpatient Medications   Medication Sig Dispense Refill     ALPRAZolam (XANAX) 0.5 MG tablet Take 1 tablet (0.5 mg total) by mouth 3 (three) times a day as needed for anxiety. 20 tablet 1     biotin 1 mg cap Take by mouth.       butalbital-acetaminophen-caffeine (FIORICET, ESGIC) -40 mg per tablet Take 1-2 tablets by mouth every 6 (six) hours as needed for pain. 30 tablet 2     butalbital-aspirin-caffeine (FIORINAL) -40 mg per capsule Take 1-2 capsules by mouth every 6 (six) hours as needed for pain. 30 capsule 2     codeine-butalbital-ASA-caff (BUTALBITAL COMPOUND W/CODEINE) capsule TAKE 1 CAPSULE BY MOUTH EVERY 4 HOURS AS NEEDED FOR PAIN 30 capsule 0     CYANOCOBALAMIN, VITAMIN B-12, ORAL Take by mouth.       MAGNESIUM ORAL Take by mouth.       mv-mn/iron/folic acid/herb 190 (VITAMIN D3 COMPLETE ORAL) Take by mouth.       No current facility-administered medications for this visit.        No Known Allergies    Social History     Tobacco Use     Smoking status: Never Smoker     Smokeless tobacco: Never Used   Substance Use Topics     Alcohol use: Not on file      Family History   Problem Relation Age of Onset     Breast cancer Paternal Aunt 70     Social History     Substance and Sexual Activity   Drug Use Not on file        Objective     /62 (Patient Position: Sitting, Cuff Size: Adult Regular)   Pulse 83   Ht 5' 4\" (1.626 m)   Wt 131 lb 9 oz (59.7 kg)   LMP 01/29/2021   SpO2 98%   BMI 22.58 kg/m    Physical Exam  General: alert, pleasant, and no distress  Head: Normocephalic, without obvious abnormality, atraumatic  Eyes: conjunctivae and sclerae normal and " pupils equal, round, reactive to   light and accomodation  Ears: normal TM's and external ear canals both ears  Nose: no discharge, no sinus tenderness  Throat: lips, mucosa, and tongue normal; teeth and gums normal  Neck: no adenopathy, supple, symmetrical, trachea midline and thyroid normal  Back: symmetric, ROM normal. No CVA tenderness.  Lungs: clear to auscultation bilaterally  Heart : regular rate and rhythm and no murmurs  Abdomen:  bowel sounds normal, no masses palpable and soft, non-tender   Extremities: extremities normal, atraumatic, no cyanosis or edema  Pulses: 2+ and symmetric  Skin: Skin color, texture, turgor normal. No rashes or lesions  Lymph nodes: Cervical, supraclavicular, and axillary nodes normal.  Neurologic: Grossly normal            No results for input(s): HGB, PLT, INR, NA, K, CREATININE, HBA1C in the last 95034 hours.     PRE-OP Diagnostics:   Labs pending at this time. Results will be reviewed when available.  No EKG required for low risk surgery (cataract, skin procedure, breast biopsy, etc).    REVISED CARDIAC RISK INDEX (RCRI)   The patient has the following serious cardiovascular risks for perioperative complications:   - No serious cardiac risks = 0 points    RCRI INTERPRETATION: 0 points: Class I (very low risk - 0.4% complication rate)       Assessment & Plan      The proposed surgical procedure is considered LOW risk.    Preop examination  - Asymptomatic COVID-19 Virus (CORONAVIRUS) PCR    Hypertrophied anal papilla    Constipation, unspecified constipation type    Migraine      Management reviewed, and she will continue the same, as she has been. We discussed some of the newer medications and she will consider those. We discussed the potential for abuse or harm from misuse for the butalbital, codeine and alprazolam, especially in combination, and we had her sign a treatment agreement today.    - butalbital-aspirin-caffeine (FIORINAL) -40 mg per capsule  Dispense: 30  capsule; Refill: 2  - butalbital-acetaminophen-caffeine (FIORICET, ESGIC) -40 mg per tablet  Dispense: 30 tablet; Refill: 2  - codeine-butalbital-ASA-caff (BUTALBITAL COMPOUND W/CODEINE) capsule  Dispense: 30 capsule; Refill: 0    Anxiety      She will continue the alprazolam as needed.        Risks and Recommendations:  The patient has the following additional risks and recommendations for perioperative complications:   - No identified additional risk factors other than previously addressed    Medication Instructions:  Patient is to hold all scheduled medications on the day of surgery    RECOMMENDATION:  APPROVAL GIVEN to proceed with proposed procedure, without further diagnostic evaluation.    Signed Electronically by: Rachel Rojo MD    Copy of this evaluation report is provided to requesting physician.    Preop [facility] Preop Guidelines    Revised Cardiac Risk Index

## 2021-06-15 NOTE — TELEPHONE ENCOUNTER
Please clarify with [facility] where procedure will be and fax preop to surgery center if needed.

## 2021-06-17 NOTE — PROGRESS NOTES
Gaviota Granado is a 46 y.o. female who is being evaluated via a billable video visit.      How would you like to obtain your AVS? Mail a copy.  If dropped from the video visit, the video invitation should be resent by: Text to cell phone: [phone]  Will anyone else be joining your video visit? No      Video Start Time: 2:00 PM    Assessment & Plan   Acute non-recurrent frontal sinusitis  Discussed possible diagnosis and treatment options with patient and answered her questions.   - azithromycin (ZITHROMAX Z-MARIAM) 250 MG tablet  Dispense: 6 tablet; Refill: 0  - fluticasone propionate (FLONASE ALLERGY RELIEF) 50 mcg/actuation nasal spray  Dispense: 16 g; Refill: 0  - loratadine (CLARITIN) 10 mg tablet  Dispense: 30 tablet; Refill: 2    20 minutes spent on the date of the encounter doing chart review, patient visit and documentation        No follow-ups on file.    Sarah Palacios [facility]   Gaviota Granado is 46 y.o. and presents today for the following health issues   HPI         Concern for COVID-19  About how many days ago did these symptoms start? 4 days  Is this your first visit for this illness? No   How would you describe your symptoms since your last visit? My symptoms have stayed the same  In the 14 days before your symptoms started, have you had close contact with someone with COVID-19 (Coronavirus)? No.  Do you have a fever or chills? Yes, the highest temperature was 99  Are you having new or worsening difficulty breathing? No  Do you have new or worsening cough? No  Have you had any new or unexplained body aches? No  Have you experienced any of the following NEW symptoms?    Headache: YES    Sore throat: YES    Loss of taste or smell: No    Chest pain: No    Diarrhea: No    Rash: No  What treatments have you tried? Decongestants   Who do you live with? Family  Are you, or a household member, a healthcare worker or a ? No  Do you  live in a nursing home, group home, or shelter? No  Do you have a way to get food/medications if quarantined? Yes, I have a friend or family member who can help me.          Review of Systems  Review of Systems - History obtained from the patient  General ROS: positive for  - fatigue and facial pain        Objective       Vitals:  No vitals were obtained today due to virtual visit.    Physical Exam  General appearance: alert, appears stated age and cooperative      Video-Visit Details    Type of service:  Video Visit    Video End Time (time video stopped): 2:16 PM  Originating Location (pt. Location): Home    Distant Location (provider location):  [facility]     Platform used for Video Visit: [employer]

## 2021-06-17 NOTE — PATIENT INSTRUCTIONS - HE
Patient Instructions by Danielito Monzon CNP at 7/3/2019 11:40 AM     Author: Danielito Monzon CNP Service: -- Author Type: Nurse Practitioner    Filed: 7/3/2019 12:21 PM Encounter Date: 7/3/2019 Status: Addendum    : Danielito Monzon CNP (Nurse Practitioner)    Related Notes: Original Note by Danielito Monzon CNP (Nurse Practitioner) filed at 7/3/2019 12:21 PM       The pharmacy has the prescription Dr. Rojo sent back in May still.  They must have not contacted you that it was ready.    I sent a refill of the alprazolam.    Urine culture will take a day or 2 to get results, but in the mean time, you can start the bactrim antibiotic I sent in.    Thank you for coming in today!    If you receive a survey from [employer] about your experience today, it would be very helpful if you could fill it out to let us know what went well and what we can improve!    General Information:    Today you had your appointment with Danielito Monzon NP    My hours are:    Monday : Out of clinic  Tuesday : 8:00AM - 5:00 PM  Wednesday: 8:00AM - 5:00 PM  Thursday: 8:00AM - 5:00 PM  Friday: 8:00AM - 5:00 PM    I am not in the office Mondays. Therefore non-urgent calls and medical messages received on Monday will be addressed when I am back in the office on Tuesday. Urgent matters will be reviewed and addressed by one of my partners in the office as needed.    If lab work was done today as part of your evaluation you will generally be contacted via [employer], mail, or phone with the results within 1-5 days. If there is an alarming result we will contact you by phone. Lab results come back at varying times, I generally wait until all lab results are available before making comments on the results.     If you need refills please contact your pharmacy. They will send a refill request to me to review. Please allow 3-5 business days for us to process all refill requests.     My Clinical Assistant is Ange. Please call us at  [phone] or send a medical message with any questions or concerns.           Patient Education     Sulfamethoxazole; Trimethoprim, SMX-TMP tablets  Brand Names: Bacter-Aid DS, Bactrim, Bactrim DS, Septra, Septra DS  What is this medicine?  SULFAMETHOXAZOLE; TRIMETHOPRIM or SMX-TMP (suhl fuh meth OK marleni zohl; trye METH oh prim) is a combination of a sulfonamide antibiotic and a second antibiotic, trimethoprim. It is used to treat or prevent certain kinds of bacterial infections. It will not work for colds, flu, or other viral infections.  How should I use this medicine?  Take this medicine by mouth with a full glass of water. Follow the directions on the prescription label. Take your medicine at regular intervals. Do not take it more often than directed. Do not skip doses or stop your medicine early.  Talk to your pediatrician regarding the use of this medicine in children. Special care may be needed. This medicine has been used in children as young as 2 months of age.  What side effects may I notice from receiving this medicine?  Side effects that you should report to your doctor or health care professional as soon as possible:    allergic reactions like skin rash or hives, swelling of the face, lips, or tongue    breathing problems    fever or chills, sore throat    irregular heartbeat, chest pain    joint or muscle pain    pain or difficulty passing urine    red pinpoint spots on skin    redness, blistering, peeling or loosening of the skin, including inside the mouth    unusual bleeding or bruising    unusually weak or tired    yellowing of the eyes or skin  Side effects that usually do not require medical attention (report to your doctor or health care professional if they continue or are bothersome):    diarrhea    dizziness    headache    loss of appetite    nausea, vomiting    nervousness  What may interact with this medicine?  Do not take this medicine with any of the following  medications:    aminobenzoate potassium    dofetilide    metronidazole  This medicine may also interact with the following medications:    ACE inhibitors like benazepril, enalapril, lisinopril, and ramipril    birth control pills    cyclosporine    digoxin    diuretics    indomethacin    medicines for diabetes    methenamine    methotrexate    phenytoin    potassium supplements    pyrimethamine    sulfinpyrazone    tricyclic antidepressants    warfarin  What if I miss a dose?  If you miss a dose, take it as soon as you can. If it is almost time for your next dose, take only that dose. Do not take double or extra doses.  Where should I keep my medicine?  Keep out of the reach of children.  Store at room temperature between 20 to 25 degrees C (68 to 77 degrees F). Protect from light. Throw away any unused medicine after the expiration date.  What should I tell my health care provider before I take this medicine?  They need to know if you have any of these conditions:    anemia    asthma    being treated with anticonvulsants    if you frequently drink alcohol containing drinks    kidney disease    liver disease    low level of folic acid or glucose-6-phosphate dehydrogenase    poor nutrition or malabsorption    porphyria    severe allergies    thyroid disorder    an unusual or allergic reaction to sulfamethoxazole, trimethoprim, sulfa drugs, other medicines, foods, dyes, or preservatives    pregnant or trying to get pregnant    breast-feeding  What should I watch for while using this medicine?  Tell your doctor or health care professional if your symptoms do not improve. Drink several glasses of water a day to reduce the risk of kidney problems.  Do not treat diarrhea with over the counter products. Contact your doctor if you have diarrhea that lasts more than 2 days or if it is severe and watery.  This medicine can make you more sensitive to the sun. Keep out of the sun. If you cannot avoid being in the sun, wear  protective clothing and use a sunscreen. Do not use sun lamps or tanning beds/booths.  NOTE:This sheet is a summary. It may not cover all possible information. If you have questions about this medicine, talk to your doctor, pharmacist, or health care provider. Copyright  2018 [employer]

## 2021-06-17 NOTE — TELEPHONE ENCOUNTER
Reason for Call:  Medication or medication refill:    Do you use a [facility]?  Name of the pharmacy and phone number for the current request: [facility]    Name of the medication requested: MAXALT OR SOMETHING LIKE IMITREX INSTEAD OF THE FIORCET   SHE WOULD LIKE SOMETHING TO HELP WITH THE MIGRANIES  Other request:     Can we leave a detailed message on this number? Yes    Phone number patient can be reached at:   Cell number on file:    Telephone Information:   Mobile [phone]       Best Time:     Call taken on 4/26/2021 at 9:12 AM by Jennifer Craig

## 2021-06-17 NOTE — TELEPHONE ENCOUNTER
Rx sent to [facility] for maxalt prn. She should let me know if she has any significant side effects.

## 2021-06-18 NOTE — PATIENT INSTRUCTIONS - HE
Patient Instructions by Sarah Palacios FNP at 5/4/2021  2:00 PM     Author: Sarah Palacios FNP Service: -- Author Type: Nurse Practitioner    Filed: 5/4/2021  2:13 PM Encounter Date: 5/4/2021 Status: Signed    : Sarah Palacios FNP (Nurse Practitioner)       Patient Education     Understanding Acute Rhinosinusitis    Acute rhinosinusitis is when the lining of the inside of the nose and the sinuses becomes irritated and swollen. It is also called sinusitis, or a sinus infection.  Sinuses are air-filled spaces in the skull behind the face. They are kept moist and clean by a lining of mucosa. Things such as pollen, smoke, and chemical fumes can irritate the mucosa. It can then swell up. As a response to irritation, the mucosa makes more mucus and other fluids. Tiny hairlike cilia cover the mucosa. Cilia help carry mucus toward the opening of the sinus. Too much mucus may cause the cilia to stop working. This blocks the sinus opening. A buildup of fluid in the sinuses then causes pain and pressure. It can also cause bacteria to grow in the sinuses.  What causes acute rhinosinusitis?  A sinus infection is most often caused by a virus. You are more likely to get one after having a cold or the flu. In some cases, a sinus infection can be caused by bacteria.  You are at higher risk for a sinus infection if you:    Are older in age    Have structural problems with your sinuses    Smoke or are exposed to secondhand smoke    Are exposed to changes in pressure, such as from flying a lot or deep sea diving    Have asthma or allergies    Have a weak immune system    Have dental disease     Symptoms of acute rhinosinusitis  Symptoms of acute rhinosinusitis often last around 7 to 10 days. If you have a bacterial infection, they may last longer. They may also get better but then worsen. You may have:    Face pain or pressure under the eyes and around the nose    Headache    Fluid draining in the back of the  throat (postnasal drip)    Congestion    Drainage that is thick and colored (often green), instead of clear    Cough    Problems with your sense of smell    Ear pain or hearing problems    Fever    Tooth pain    Fatigue  Diagnosing acute rhinosinusitis  Your healthcare provider will ask about your symptoms and past health. He or she will look at your ears, nose, throat, and sinuses. Imaging tests, such as X-rays, are often not needed.  It can be hard to figure out if a sinus infection is caused by a virus or bacterium. A bacterial infection tends to last longer. Symptoms may also get better but then worsen. Your healthcare provider may take a sample of mucus from your nose to check for bacteria.  Treating acute rhinosinusitis  Most sinus infections will go away within 10 days. Your body will fight off the virus. If your symptoms seem to get better but then worsen, you may have a bacterial infection instead. Your healthcare provider will then give you antibiotics. Take this medicine until it is gone, even if you feel better.  To help ease your symptoms, your healthcare provider may advise:    Over-the-counter pain relievers. Medicines such as acetaminophen or ibuprofen can ease sinus pain. They may also lower a fever.    Nasal washes. Washing your nasal passages with salt water may ease pain and pressure. It can rinse out mucous and other irritants from your sinuses. Your healthcare provider can show you how to do it.    Nasal steroid spray. This prescription medicine can reduce inflammation in your sinuses.    Other medicines. Decongestants, antihistamines, and other nasal sprays may give short-term relief. They may help with congestion. Talk with your healthcare provider before taking these medicines.     Preventing acute rhinosinusitis  You can help prevent a sinus infection with these steps:    Wash your hands well and often.    Stay away from people who have a cold or upper respiratory infection.    Don't smoke.  And stay away from secondhand smoke.    Use a humidifier at home.    Make sure you are up-to-date on your vaccines, such as the flu shot.     When to call your healthcare provider  Call your healthcare provider right away if you have any of these:    Fever of 100.4 F (38 C) or higher, or as directed by your healthcare provider    Pain that gets worse    Symptoms that dont get better, or get worse    New symptoms  Date Last Reviewed: 6/1/2019 2000-2019 [employer]. [address] [place] [address]. All rights reserved. This information is not intended as a substitute for professional medical care. Always follow your healthcare professional's instructions.

## 2021-06-18 NOTE — PROGRESS NOTES
Chief Complaint   Patient presents with     Diarrhea     Pt has been having diarrhea for the last week which she thinks may be food poisoning.        HPI: 43-year-old female who presents today with diarrhea for the past 8 days.  She was eating  food and left in her car for an hour and ate more and she is pretty sure that she has contracted a foodborne illness.  She did have significant abdominal pain the first day however that is since resolved.  She also took Pepto-Bismol on the first day which did not provide much assistance.  She does not normally have diarrhea, in fact usually will have constipation issues so this is a new problem for her.  No antibiotics or hospital stays in the last 3 months.  The diarrhea is liquid and alternates from yellow to brown in color.  No mucus.  No melena or blood.  She sometimes will feel warm, but is afebrile when temperature is checked.  She has no known close contacts that are sick as well.  She has had 5 liquid bowel movements since last night alone.    ROS:Review of Systems - History obtained from the patient  General ROS: positive for  - chills and fatigue  Respiratory ROS: negative  Cardiovascular ROS: negative  Gastrointestinal ROS: positive for - appetite loss, change in bowel habits, change in stools, diarrhea and nausea  Genito-Urinary ROS: negative  Musculoskeletal ROS: negative  Dermatological ROS: negative    SH: The Patient's  reports that she has never smoked. She has never used smokeless tobacco.      FH: The Patient's family history includes Breast cancer (age of onset: 70) in her paternal aunt.     Meds:    Current Outpatient Prescriptions on File Prior to Visit   Medication Sig Dispense Refill     ALPRAZolam (XANAX) 0.5 MG tablet TAKE 1 TO 2 TABLETS BY MOUTH EVERY 8 HOURS AS NEEDED FOR ANXIETY 20 tablet 0     BUTALBITAL COMPOUND W/CODEINE capsule TAKE 1 CAPSULE BY MOUTH EVERY 4 HOURS AS NEEDED FOR PAIN 10 capsule 5     butalbital-acetaminophen-caffeine  (FIORICET, ESGIC) -40 mg per tablet TAKE 1-2 TABLETs BY MOUTH EVERY 6 HOURS AS NEEDED FOR SEVERE HEADACHE. 20 tablet 5     butalbital-aspirin-caffeine (FIORINAL) -40 mg per capsule TAKE 1 TO 2 CAPSULES BY MOUTH EVERY 4 HOURS AS NEEDED 30 capsule 0     escitalopram oxalate (LEXAPRO) 10 MG tablet Start 1/2 tab daily and increase to full tab after 1-2 weeks as tolerated 30 tablet 2     No current facility-administered medications on file prior to visit.        O:  /60  Pulse 72  Temp 97.8  F (36.6  C)  Wt 113 lb 9.6 oz (51.5 kg)  BMI 19.5 kg/m2    Physical Examination:   General appearance - alert, well appearing, and in no distress  Mental status - alert, oriented to person, place, and time  Eyes - pupils equal and reactive, extraocular eye movements intact  Ears - bilateral TM's and external ear canals normal  Nose - normal and patent, no erythema, discharge or polyps  Mouth - mucous membranes moist, pharynx normal without lesions  Neck - supple, no significant adenopathy  Lymphatics - no palpable lymphadenopathy, no hepatosplenomegaly  Chest - clear to auscultation, no wheezes, rales or rhonchi, symmetric air entry  Heart - normal rate and regular rhythm, S1 and S2 normal, no murmurs noted  Abdomen -hyperactive bowel sounds heard throughout all 4 quadrants.  There is no tenderness to palpation.  No organomegaly.  Extremities - peripheral pulses normal, no pedal edema, no clubbing or cyanosis  Skin - normal coloration and turgor, no rashes, no suspicious skin lesions noted      A/P:     Problem List Items Addressed This Visit     None      Visit Diagnoses     Diarrhea of presumed infectious origin    -  Primary    Relevant Orders    HM1(CBC and Differential)    Basic Metabolic Panel    Culture, Stool    HM1 (CBC with Diff)            1. Diarrhea of presumed infectious origin  Discussed that this is most likely a foodborne pathogen, but given her symptomology along with her profuse liquid  diarrhea, we elected to go ahead with some initial testing.  I did inform the patient that if she is unable to remain hydrated over the weekend her symptoms worsen, she should be evaluated in urgent care or ED for fluid resuscitation.  Otherwise, this will most likely be a self-limiting process and supportive measures provided.  Recommend following up later this summer for an annual physical.  - HM1(CBC and Differential)  - Basic Metabolic Panel  - Culture, Stool; Future  - HM1 (CBC with Diff)        Danielito Monzon, DILCIA    This transcription was created utilizing voice recognition software and may contain typographical errors.

## 2021-06-20 NOTE — PROGRESS NOTES
Assessment:     1. Migraine  butalbital-acetaminophen-caffeine (FIORICET, ESGIC) -40 mg per tablet    codeine-butalbital-ASA-caff (BUTALBITAL COMPOUND W/CODEINE) capsule   2. Hair loss     3. Visit for screening mammogram  Mammo Screening Bilateral   4. Anxiety  ALPRAZolam (XANAX) 0.5 MG tablet          Plan:         We reviewed the treatment options for her migraine symptoms and we will renew her fiorinal and fiorcet which she alternates between. We discussed the nature of the medication and the fact that it is controlled. We also reviewed the possibility that it may not be available at some point and we discussed some alternatives. I also renewed her alprazolam which she takes for flying. We reviewed use of OTC analgesics as well as increased fluids and rest, and she will call or return to clinic with any ongoing or worsening symptoms.  She will otherwise will f/u in  6-12 mos for routine med check/evaluation.   For HCM, she will schedule a mammogram in the near future, and she will continue with regular pap smears. We do not have a copy of a recent pap - her last on file was 2009.         Subjective:         Headaches       Gaviota Granado is a 43 y.o. female who presents for follow-up of migraine headaches. Headaches are occurring several times per month, usually related to her menstural cycles and with ovulation. Generally, the headaches last several hours. Home treatment has included fioricet and Fiorinal with marked improvement. She alternates between the two depending on how often she needs to take it as too much of the aspirin-based medication. She has been on multiple migraine treatments in the past without relief. The pain alternates sides. Headaches are a bit worse in the fall. She denies any nasal drainage, but does get sinus pressure and itchy eyes. She reports increased fatigue and some hair loss recently as well.     The following portions of the patient's history were reviewed and updated as  appropriate: allergies, current medications, past family history, past medical history, past social history, past surgical history and problem list.    Review of Systems  A 12 point comprehensive review of systems was negative except as noted.    She takes alprazolam prn flying.   Lots of stressors - Mom has dementia, divorce, switching companies   Chronic constipation - issues with diarrhea on/off        Objective:        /70 (Patient Site: Right Arm, Patient Position: Sitting, Cuff Size: Adult Regular)  Pulse 66  Wt 117 lb (53.1 kg)  SpO2 99%  BMI 20.08 kg/m2  Physical Exam:  GEN: Alert and oriented, NAD,  well nourished  SKIN:  Normal skin turgor, no lesions/rashes   HEENT: moist mucous membranes, no rhinorrhea.    NECK: Normal.  No adenopathy or thyromegaly.  CV: Regular rate and rhythm, no murmurs.   LUNGS: Clear to auscultation bilaterally.    ABDOMEN: Soft, non-tender, non-distended, no masses   EXTREMITY: No edema, cyanosis  NEURO: Grossly normal.

## 2021-06-20 NOTE — LETTER
Letter by Rachel Rojo MD at      Author: Rachel Rojo MD Service: -- Author Type: --    Filed:  Encounter Date: 9/29/2020 Status: (Other)         [facility] FAMILY MEDICINE/OB  09/29/20    Patient: Gaviota Granado  YOB: 1975  Medical Record Number: [id number]                                                                  Opioid / Opioid Plus Controlled Substance Agreement    I understand that my care provider has prescribed an opioid (narcotic) controlled substance to help manage my condition(s). I am taking this medicine to help me function or work. I know this is strong medicine, and that it can cause serious side effects. Opioid medicine can be sedating, addicting and may cause a dependency on the drug. They can affect my ability to drive or think, and cause depression. They need to be taken exactly as prescribed. Combining opioids with certain medicines or chemicals (such as cocaine, sedatives and tranquilizers, sleeping pills, meth) can be dangerous or even fatal. Also, if I stop opioids suddenly, I may have severe withdrawal symptoms. Last, I understand that opioids do not work for all types of pain nor for all patients. If not helpful, I may be asked to stop them.    {Benzo / Stimulant (Optional):126851}    The risks, benefits, and side effects of these medicine(s) were explained to me. I agree that:    1. I will take part in other treatments as advised by my care team. This may be psychiatry or counseling, physical therapy, behavioral therapy, group treatment or a referral to a pain clinic. I will reduce or stop my medicine when my care team tells me to do so.  2. I will take my medicines as prescribed. I will not change the dose or schedule unless my care team tells me to. There will be no refills if I run out early.  I may be contactedwithout warning and asked to complete a urine drug test or pill count at any time.   3. I will keep all my appointments, and  understand this is part of the monitoring of opioids. My care team may require an office visit for EVERY opioid/controlled substance refill. If I miss appointments or dont follow instructions, my care team may stop my medicine.  4. I will not ask other providers to prescribe controlled substances, and I will not accept controlled substances from other people. If I need another prescribed controlled substance for a new reason, I will tell my care team within 1 business day.  5. I will use one pharmacy to fill all of my controlled substance prescriptions, and it is up to me to make sure that I do not run out of my medicines on weekends or holidays. If my care team is willing to refill my opioid prescription without a visit, I must request refills only during office hours, refills may take up to 3 days to process, and it may take up to 5 to 7 days for my medicine to be mailed and ready at my pharmacy. Prescriptions will not be mailed anywhere except my pharmacy.        [id number]  Rev 12/18         Registration to scan to EHR                             Page 1 of 2               Controlled Substance Agreement Opioid        [facility] FAMILY MEDICINE/OB  09/29/20  Patient: Gaviota Granado  YOB: 1975  Medical Record Number: [id number]                                                                  6. I am responsible for my prescriptions. If the medicine/prescription is lost or stolen, it will not be replaced. I also agree not to share controlled substance medicines with anyone.  7. I agree to not use ANY illegal or recreational drugs. This includes marijuana, cocaine, bath salts or other drugs. I agree not to use alcohol unless my care team says I may.          I agree to give urine samples whenever asked. If I dont give a urine sample, the care team may stop my medicine.    8. If I enroll in the [place] Medical Marijuana program, I will tell my care team. I will also sign an agreement to share my  medical records with my care team.   9. I will bring in my list of medicines (or my medicine bottles) each time I come to the clinic.   10. I will tell my care team right away if I become pregnant or have a new medical problem treated outside of my regular clinic.  11. I understand that this medicine can affect my thinking and judgment. It may be unsafe for me to drive, use machinery and do dangerous tasks. I will not do any of these things until I know how the medicine affects me. If my dose changes, I will wait to see how it affects me. I will contact my care team if I have concerns about medicine side effects.    I understand that if I do not follow any of the conditions above, my prescriptions or treatment may be stopped.      I agree that my provider, clinic care team, and pharmacy may work with any city, state or federal law enforcement agency that investigates the misuse, sale, or other diversion of my controlled medicine. I will allow my provider to discuss my care with or share a copy of this agreement with any other treating provider, pharmacy or emergency room where I receive care. I agree to give up (waive) any right of privacy or confidentiality with respect to these consents.     I have read this agreement and have asked questions about anything I did not understand.      ________________________________________________________________________  Patient signature - Date/Time -  Gaviota Granado                                      ________________________________________________________________________  Witness signature                                                            ________________________________________________________________________  Provider signature - Rachel Rojo MD      [id number]  Rev 12/18         Registration to scan to EHR                         Page 2 of 2                   Controlled Substance Agreement Opioid           Page 1 of 2  Opioid Pain Medicines (also known as  Narcotics)  What You Need to Know    What are opioids?   Opioids are pain medicines that must be prescribed by a doctor.  They are also known as narcotics.    Examples are:     morphine (MS Contin, Kaitlyn)    oxycodone (Oxycontin)    oxycodone and acetaminophen (Percocet)    hydrocodone and acetaminophen (Vicodin, Norco)     fentanyl patch (Duragesic)     hydromorphone (Dilaudid)     methadone     What do opioids do well?   Opioids are best for short-term pain after a surgery or injury. They also work well for cancer pain. Unlike other pain medicines, they do not cause liver or kidney failure or ulcers. They may help some people with long-lasting (chronic) pain.     What do opioids NOT do well?   Opioids never get rid of pain entirely, and they do not work well for most patients with chronic pain. Opioids do not reduce swelling, one of the causes of pain. They also dont work well for nerve pain.                           For informational purposes only.  Not to replace the advice of your care provider.  Copyright [address] [facility]. All right reserved. [employer] 095513-Yoi 02/18.      Page 2 of 2    Risks and side effects   Talk to your doctor before you start or decide to keep taking one of these medicines. Side effects include:    Lowering your breathing rate enough to cause death    Overdose, including death, especially if taking higher than prescribed doses    Long-term opioid use    Worse depression symptoms; less pleasure in things you usually enjoy    Feeling tired or sluggish    Slower thoughts or cloudy thinking    Being more sensitive to pain over time; pain is harder to control    Trouble sleeping or restless sleep    Changes in hormone levels (for example, less testosterone)    Changes in sex drive or ability to have sex    Constipation    Unsafe driving    Itching and sweating    Feeling dizzy    Nausea, vomiting and dry mouth    What else should I know about opioids?  When someone takes  opioids for too long or too often, they become dependent. This means that if you stop or reduce the medicine too quickly, you will have withdrawal symptoms.    Dependence is not the same as addiction. Addiction is when people keep using a substance that harms their body, their mind or their relations with others. If you have a history of drug or alcohol abuse, taking opioids can cause a relapse.    Over time, opioids dont work as well. Most people will need higher and higher doses. The higher the dose, the more serious the side effects. We dont know the long-term effects of opioids.      Prescribed opioids aren't the best way to manage chronic pain    Other ways to manage pain include:      Ibuprofen or acetaminophen.  You should always try this first.      Treat health problems that may be causing pain.      acupuncture or massage, deep breathing, meditation, visual imagery, aromatherapy.      Use heat or ice at the pain site      Physical therapy and exercise      Stop smoking      See a counselor or therapist                                                  People who have used opioids for a long time may have a lower quality of life, worse depression, higher levels of pain and more visits to doctors.    Never share your opioids with others. Be sure to store opioids in a secure place, locked if possible.Young children can easily swallow them and overdose.     You can overdose on opioids.  Signs of overdose include decrease or loss of consciousness, slowed breathing, trouble waking and blue lips.  If someone is worried about overdose, they should call 911.    If you are at risk for overdose, you may get naloxone (Narcan, a medicine that reverses the effects of opioids.  If you overdose, a friend or family member can give you Narcan while waiting for the ambulance.  They need to know the signs of overdose and how to give Narcan.    While you're taking opioids:    Don't use alcohol or street drugs. Taking them  together can cause death.    Don't take any of these medicines unless your doctor says its okay.  Taking these with opioids can cause death.    Benzodiazepines (such as lorazepam         or diazepam)    Muscle relaxers (such as cyclobenzaprine)    sleeping pills    other opioids    Safe disposal of opioids  Find your area drug take-back program, your pharmacy mail-back program, buy a special disposal bag (such as Deterra) from your pharmacy or flush them down the toilet.  Use the guidelines at:  www.fda.gov/drugs/resourcesforyou

## 2021-06-20 NOTE — LETTER
Letter by Rachel Rojo MD at      Author: Rachel Rojo MD Service: -- Author Type: --    Filed:  Encounter Date: 9/29/2020 Status: (Other)         [facility] FAMILY MEDICINE/OB  09/29/20    Patient: Gaviota Granado  YOB: 1975  Medical Record Number: [id number]  CSN: [id number]                                                                              Non-opioid Controlled Substance Agreement    I understand that my care provider has prescribed a controlled substance to help manage my condition(s). I am taking this medicine to help me function or work. I know this is strong medicine, and that it can cause serious side effects. Controlled substances can be sedating, addicting and may cause a dependency on the drug. They can affect my ability to drive or think, and cause depression. They need to be taken exactly as prescribed. Combining controlled substances with certain medicines or chemicals (such as cocaine, sedatives and tranquilizers, sleeping pills, meth) can be dangerous or even fatal. Also, if I stop controlled substances suddenly, I may have severe withdrawal symptoms.  If not helpful, I may be asked to stop them.    The risks, benefits, and side effects of these medicine(s) were explained to me. I agree that:    1. I will take part in other treatments as advised by my care team. This may be psychiatry or counseling, physical therapy, behavioral therapy, group treatment or a referral to a pain clinic. I will reduce or stop my medicine when my care team tells me to do so.  2. I will take my medicines as prescribed. I will not change the dose or schedule unless my care team tells me to. There will be no refills if I run out early.  I may be contactedwithout warning and asked to complete a urine drug test or pill count at any time.   3. I will keep all my appointments, and understand this is part of the monitoring of controlled substances. My care team may require an  office visit for EVERY controlled substance refill. If I miss appointments or dont follow instructions, my care team may stop my medicine.  4. I will not ask other providers to prescribe controlled substances, and I will not accept controlled substances from other people. If I need another prescribed controlled substance for a new reason, I will tell my care team within 1 business day.  5. I will use one pharmacy to fill all of my controlled substance prescriptions, and it is up to me to make sure that I do not run out of my medicines on weekends or holidays. If my care team is willing to refill my controlled substance prescription without a visit, I must request refills only during office hours, refills may take up to 3 days to process, and it may take up to 5 to 7 days for my medicine to be mailed and ready at my pharmacy. Prescriptions will not be mailed anywhere except my pharmacy.    6. I am responsible for my prescriptions. If the medicine/prescription is lost or stolen, it will not be replaced. I also agree not to share controlled substance medicines with anyone.          [facility] FAMILY MEDICINE/OB  09/29/20  Patient:  Gaviota Granado  YOB: 1975  Medical Record Number: [id number]  CSN: [id number]    7. I agree to not use ANY illegal or recreational drugs. This includes marijuana, cocaine, bath salts or other drugs. I agree not to use alcohol unless my care team says I may. I agree to give urine samples whenever asked. If I dont give a urine sample, the care team may stop my medicine.    8. If I enroll in the [place] Medical Marijuana program, I will tell my care team. I will also sign an agreement to share my medical records with my care team.    9. I will bring in my list of medicines (or my medicine bottles) each time I come to the clinic.   10. I will tell my care team right away if I become pregnant or have a new medical problem treated outside of my regular clinic.  11. I  understand that this medicine can affect my thinking and judgment. It may be unsafe for me to drive, use machinery and do dangerous tasks. I will not do any of these things until I know how the medicine affects me. If my dose changes, I will wait to see how it affects me. I will contact my care team if I have concerns about medicine side effects.    I understand that if I do not follow any of the conditions above, my prescriptions or treatment may be stopped.      I agree that my provider, clinic care team, and pharmacy may work with any city, state or federal law enforcement agency that investigates the misuse, sale, or other diversion of my controlled medicine. I will allow my provider to discuss my care with or share a copy of this agreement with any other treating provider, pharmacy or emergency room where I receive care. I agree to give up (waive) any right of privacy or confidentiality with respect to these consents.   I have read this agreement and have asked questions about anything I did not understand.    ___________________________________________________________________________  Patient signature - Date/Time  -Gaviota rGanado                                      ___________________________________________________________________________  Witness signature                                                                    ___________________________________________________________________________  Provider signature- Rachel Rojo MD

## 2021-06-20 NOTE — LETTER
Letter by Rachel Rojo MD at      Author: Rachel Rojo MD Service: -- Author Type: --    Filed:  Encounter Date: 3/23/2020 Status: (Other)         [facility] FAMILY MEDICINE/OB  03/23/20    Patient: Gaviota Granado  YOB: 1975  Medical Record Number: [id number]  CSN: [id number]                                                                              Non-opioid Controlled Substance Agreement    I understand that my care provider has prescribed a controlled substance to help manage my condition(s). I am taking this medicine to help me function or work. I know this is strong medicine, and that it can cause serious side effects. Controlled substances can be sedating, addicting and may cause a dependency on the drug. They can affect my ability to drive or think, and cause depression. They need to be taken exactly as prescribed. Combining controlled substances with certain medicines or chemicals (such as cocaine, sedatives and tranquilizers, sleeping pills, meth) can be dangerous or even fatal. Also, if I stop controlled substances suddenly, I may have severe withdrawal symptoms.  If not helpful, I may be asked to stop them.    The risks, benefits, and side effects of these medicine(s) were explained to me. I agree that:    1. I will take part in other treatments as advised by my care team. This may be psychiatry or counseling, physical therapy, behavioral therapy, group treatment or a referral to a pain clinic. I will reduce or stop my medicine when my care team tells me to do so.  2. I will take my medicines as prescribed. I will not change the dose or schedule unless my care team tells me to. There will be no refills if I run out early.  I may be contactedwithout warning and asked to complete a urine drug test or pill count at any time.   3. I will keep all my appointments, and understand this is part of the monitoring of controlled substances. My care team may require an  office visit for EVERY controlled substance refill. If I miss appointments or dont follow instructions, my care team may stop my medicine.  4. I will not ask other providers to prescribe controlled substances, and I will not accept controlled substances from other people. If I need another prescribed controlled substance for a new reason, I will tell my care team within 1 business day.  5. I will use one pharmacy to fill all of my controlled substance prescriptions, and it is up to me to make sure that I do not run out of my medicines on weekends or holidays. If my care team is willing to refill my controlled substance prescription without a visit, I must request refills only during office hours, refills may take up to 3 days to process, and it may take up to 5 to 7 days for my medicine to be mailed and ready at my pharmacy. Prescriptions will not be mailed anywhere except my pharmacy.    6. I am responsible for my prescriptions. If the medicine/prescription is lost or stolen, it will not be replaced. I also agree not to share controlled substance medicines with anyone.          [facility] FAMILY MEDICINE/OB  03/23/20  Patient:  Gaviota Granado  YOB: 1975  Medical Record Number: [id number]  CSN: [id number]    7. I agree to not use ANY illegal or recreational drugs. This includes marijuana, cocaine, bath salts or other drugs. I agree not to use alcohol unless my care team says I may. I agree to give urine samples whenever asked. If I dont give a urine sample, the care team may stop my medicine.    8. If I enroll in the [place] Medical Marijuana program, I will tell my care team. I will also sign an agreement to share my medical records with my care team.    9. I will bring in my list of medicines (or my medicine bottles) each time I come to the clinic.   10. I will tell my care team right away if I become pregnant or have a new medical problem treated outside of my regular clinic.  11. I  understand that this medicine can affect my thinking and judgment. It may be unsafe for me to drive, use machinery and do dangerous tasks. I will not do any of these things until I know how the medicine affects me. If my dose changes, I will wait to see how it affects me. I will contact my care team if I have concerns about medicine side effects.    I understand that if I do not follow any of the conditions above, my prescriptions or treatment may be stopped.      I agree that my provider, clinic care team, and pharmacy may work with any city, state or federal law enforcement agency that investigates the misuse, sale, or other diversion of my controlled medicine. I will allow my provider to discuss my care with or share a copy of this agreement with any other treating provider, pharmacy or emergency room where I receive care. I agree to give up (waive) any right of privacy or confidentiality with respect to these consents.   I have read this agreement and have asked questions about anything I did not understand.    ___________________________________________________________________________  Patient signature - Date/Time  -Gaviota Granado                                      ___________________________________________________________________________  Witness signature                                                                    ___________________________________________________________________________  Provider signature- Rachel Rojo MD

## 2021-06-21 NOTE — LETTER
Letter by Rachel Rojo MD at      Author: Rachel Rojo MD Service: -- Author Type: --    Filed:  Encounter Date: 2/2/2021 Status: (Other)         [facility]  02/02/21    Patient: Gaviota Granado  YOB: 1975  Medical Record Number: [id number]  CSN: [id number]                                                                              Non-opioid Controlled Substance Agreement    I understand that my care provider has prescribed a controlled substance to help manage my condition(s). I am taking this medicine to help me function or work. I know this is strong medicine, and that it can cause serious side effects. Controlled substances can be sedating, addicting and may cause a dependency on the drug. They can affect my ability to drive or think, and cause depression. They need to be taken exactly as prescribed. Combining controlled substances with certain medicines or chemicals (such as cocaine, sedatives and tranquilizers, sleeping pills, meth) can be dangerous or even fatal. Also, if I stop controlled substances suddenly, I may have severe withdrawal symptoms.  If not helpful, I may be asked to stop them.    The risks, benefits, and side effects of these medicine(s) were explained to me. I agree that:    1. I will take part in other treatments as advised by my care team. This may be psychiatry or counseling, physical therapy, behavioral therapy, group treatment or a referral to a pain clinic. I will reduce or stop my medicine when my care team tells me to do so.  2. I will take my medicines as prescribed. I will not change the dose or schedule unless my care team tells me to. There will be no refills if I run out early.  I may be contactedwithout warning and asked to complete a urine drug test or pill count at any time.   3. I will keep all my appointments, and understand this is part of the monitoring of controlled substances. My care team may require an  office visit for EVERY controlled substance refill. If I miss appointments or dont follow instructions, my care team may stop my medicine.  4. I will not ask other providers to prescribe controlled substances, and I will not accept controlled substances from other people. If I need another prescribed controlled substance for a new reason, I will tell my care team within 1 business day.  5. I will use one pharmacy to fill all of my controlled substance prescriptions, and it is up to me to make sure that I do not run out of my medicines on weekends or holidays. If my care team is willing to refill my controlled substance prescription without a visit, I must request refills only during office hours, refills may take up to 3 days to process, and it may take up to 5 to 7 days for my medicine to be mailed and ready at my pharmacy. Prescriptions will not be mailed anywhere except my pharmacy.    6. I am responsible for my prescriptions. If the medicine/prescription is lost or stolen, it will not be replaced. I also agree not to share controlled substance medicines with anyone.          [facility]  02/02/21  Patient:  Gaviota Granado  YOB: 1975  Medical Record Number: [id number]  CSN: [id number]    7. I agree to not use ANY illegal or recreational drugs. This includes marijuana, cocaine, bath salts or other drugs. I agree not to use alcohol unless my care team says I may. I agree to give urine samples whenever asked. If I dont give a urine sample, the care team may stop my medicine.    8. If I enroll in the [place] Medical Marijuana program, I will tell my care team. I will also sign an agreement to share my medical records with my care team.    9. I will bring in my list of medicines (or my medicine bottles) each time I come to the clinic.   10. I will tell my care team right away if I become pregnant or have a new medical problem treated outside of my regular clinic.  11. I  understand that this medicine can affect my thinking and judgment. It may be unsafe for me to drive, use machinery and do dangerous tasks. I will not do any of these things until I know how the medicine affects me. If my dose changes, I will wait to see how it affects me. I will contact my care team if I have concerns about medicine side effects.    I understand that if I do not follow any of the conditions above, my prescriptions or treatment may be stopped.      I agree that my provider, clinic care team, and pharmacy may work with any city, state or federal law enforcement agency that investigates the misuse, sale, or other diversion of my controlled medicine. I will allow my provider to discuss my care with or share a copy of this agreement with any other treating provider, pharmacy or emergency room where I receive care. I agree to give up (waive) any right of privacy or confidentiality with respect to these consents.   I have read this agreement and have asked questions about anything I did not understand.    ___________________________________________________________________________  Patient signature - Date/Time  -Gaviota Granado                                      ___________________________________________________________________________  Witness signature                                                                    ___________________________________________________________________________  Provider signature- Rachel Rojo MD

## 2021-06-22 NOTE — TELEPHONE ENCOUNTER
Pt contacted. She states that she feels better right now after advil and rest so is going to try and go to work. She is thankful for the offer of the note but feels that she's well enough currently to try and go.     H.DeGree, CMA

## 2021-06-22 NOTE — TELEPHONE ENCOUNTER
I am happy to write a note for her not to go to required work mtg.  The only option remaining would be to go to ER.  They have additional medications to use.  But she still should not go to work!  Johana Talbert MD

## 2021-06-22 NOTE — TELEPHONE ENCOUNTER
Migraine , appt at 5.  2 fioricet not working.    Icing temple and neck and trying to sleep.    No blurred or double vision.    Was on Maxalt but had increased heart rate.  Took remeron? Once from neurologist and it worked well.    Will try 2 advil , increased fluids and some caffine to see if that helps.  Rest too.    Would covering provider like to try a migraine medication or offer for patient come in for a toradol shot?  Has mandatory meeting at 4pm for work.    Felipa Griffin, RN, [facility] Nurse Triage/Med Refills RN

## 2021-06-25 NOTE — PROGRESS NOTES
Assessment/Plan:        Diagnoses and all orders for this visit:    Cough  -     codeine-guaiFENesin (GUAIFENESIN AC)  mg/5 mL liquid; Take 10 mL by mouth at bedtime as needed for cough.  Dispense: 240 mL; Refill: 0  -     Discontinue: azithromycin (ZITHROMAX) 250 MG tablet; Take 500 mg (2 x 250 mg tablets) on day 1 followed by 250 mg (1 tablet) on days 2-5.  Dispense: 6 tablet; Refill: 0  -     azithromycin (ZITHROMAX) 250 MG tablet; Take 500 mg (2 x 250 mg tablets) on day 1 followed by 250 mg (1 tablet) on days 2-5.  Dispense: 6 tablet; Refill: 0  I do think that the cause of the cough is postnasal drip, discussed the ways that she can lessen the postnasal drainage.  I do not think that she has any pneumonia at this time both from the history as well as on examination.  I did give her medication to help with the cough and also a prescription for Zithromax that she can use if there is no improvement in her symptoms within the next 5 days.        Subjective:    Patient ID: Gaviota Granado is a 44 y.o. female.    Was at a nursing home to visit with her mother and was exposed to influenza.  She started having some influenza-like symptoms which had gotten better currently but she does continue to have cough.  Cough is said to be deep, intermittently productive.  She does have postnasal drip which she thinks may be the cause of the cough.  No fevers and no chills.      Cough   This is a new problem. The current episode started in the past 7 days. The problem has been waxing and waning. Associated symptoms include chest pain, coughing, fatigue and headaches. Pertinent negatives include no abdominal pain, chills, fever or sore throat. Associated symptoms comments: She describes upper anterior chest pressure.       The following portions of the patient's history were reviewed and updated as appropriate: allergies, current medications, past family history, past medical history, past social history, past surgical  history and problem list.    Review of Systems   Constitutional: Positive for fatigue. Negative for chills and fever.   HENT: Positive for postnasal drip. Negative for sinus pain and sore throat.    Respiratory: Positive for cough. Negative for shortness of breath and wheezing.    Cardiovascular: Positive for chest pain.   Gastrointestinal: Negative for abdominal pain.   Neurological: Positive for headaches. Negative for light-headedness.     Vitals:    03/18/19 1142   BP: 102/68   Pulse: 84   Temp: 98.2  F (36.8  C)   TempSrc: Oral   SpO2: 99%   Weight: 113 lb 9 oz (51.5 kg)             Objective:    Physical Exam   Constitutional: She appears well-developed and well-nourished. No distress.   She is afebrile to touch and not pale.   HENT:   Right Ear: Tympanic membrane normal.   Left Ear: Tympanic membrane normal.   Nose: Right sinus exhibits no maxillary sinus tenderness and no frontal sinus tenderness. Left sinus exhibits no maxillary sinus tenderness and no frontal sinus tenderness.   Mouth/Throat: No posterior oropharyngeal edema or posterior oropharyngeal erythema.   Right ear looks slightly bulged out otherwise normal.   Neck: Neck supple. No thyromegaly present.   Cardiovascular: Normal rate and regular rhythm.   Pulmonary/Chest: Effort normal and breath sounds normal.   Lymphadenopathy:     She has no cervical adenopathy.   Neurological: She is alert.

## 2022-04-20 DIAGNOSIS — G43.909 MIGRAINE: ICD-10-CM

## 2022-04-24 NOTE — TELEPHONE ENCOUNTER
Routing refill request to provider for review/approval because:  Control substance request    Last Written Prescription Date:  10/29/21  Last Fill Quantity: 30,  # refills: 0   Last office visit provider:  5/4/21     Requested Prescriptions   Pending Prescriptions Disp Refills     butalbital-aspirin-caffeine (FIORINAL) -40 MG capsule [Pharmacy Med Name: BUTALBITAL/ASPIRIN/CAFFEINE CAPS] 30 capsule      Sig: TAKE 1 TO 2 CAPSULES BY MOUTH EVERY 6 HOURS AS NEEDED FOR PAIN       There is no refill protocol information for this order          Reyna Muse 04/23/22 8:00 PM

## 2022-04-25 RX ORDER — BUTALBITAL, ASPIRIN, AND CAFFEINE 325; 50; 40 MG/1; MG/1; MG/1
CAPSULE ORAL
Qty: 30 CAPSULE | Refills: 0 | Status: SHIPPED | OUTPATIENT
Start: 2022-04-25 | End: 2022-09-12

## 2022-04-25 NOTE — TELEPHONE ENCOUNTER
Medication: Fiorinal -40 MG capsule  Last Date Filled 10/29/21  Last appointment addressing medication use: 2/2/21      Taken as prescribed from physician notes? YES    CSA in last year: NO    Random Utox in last year: NO  (if any of the above answer NO - schedule with PCP)     Opioids + benzodiazepines? NO  (if the above answer YES - schedule with PCP every 6 months)       All responses suggest: .

## 2022-09-12 DIAGNOSIS — G43.909 MIGRAINE: ICD-10-CM

## 2022-09-12 RX ORDER — BUTALBITAL, ASPIRIN, AND CAFFEINE 325; 50; 40 MG/1; MG/1; MG/1
CAPSULE ORAL
Qty: 30 CAPSULE | Refills: 0 | Status: SHIPPED | OUTPATIENT
Start: 2022-09-12 | End: 2022-11-09 | Stop reason: ALTCHOICE

## 2022-09-12 NOTE — TELEPHONE ENCOUNTER
Please inform pt that she needs to establish care with a pcp for her medication management asap. I will refill once to bridge her.     Ora Horowitz MD on 9/12/2022 at 2:32 PM'

## 2022-09-12 NOTE — TELEPHONE ENCOUNTER
Spoke with pt, relayed provider message below. Pt understood and will callback to set up an appt to establish care with a PCP.

## 2022-09-12 NOTE — TELEPHONE ENCOUNTER
Refill Request  Medication name: Pending Prescriptions:                       Disp   Refills    butalbital-aspirin-caffeine (FIORINAL) 50*30 cap*0            Sig: TAKE 1 TO 2 CAPSULES BY MOUTH EVERY 6 HOURS AS           NEEDED FOR PAIN    [facility]ed [facility]

## 2022-11-09 ENCOUNTER — VIRTUAL VISIT (OUTPATIENT)
Dept: FAMILY MEDICINE | Facility: CLINIC | Age: 47
End: 2022-11-09
Payer: COMMERCIAL

## 2022-11-09 DIAGNOSIS — G43.809 OTHER MIGRAINE WITHOUT STATUS MIGRAINOSUS, NOT INTRACTABLE: ICD-10-CM

## 2022-11-09 DIAGNOSIS — F41.9 ANXIETY: ICD-10-CM

## 2022-11-09 DIAGNOSIS — N63.10 BILATERAL BREAST LUMP: Primary | ICD-10-CM

## 2022-11-09 DIAGNOSIS — N63.20 BILATERAL BREAST LUMP: Primary | ICD-10-CM

## 2022-11-09 PROCEDURE — 99214 OFFICE O/P EST MOD 30 MIN: CPT | Mod: 95 | Performed by: PHYSICIAN ASSISTANT

## 2022-11-09 RX ORDER — ALPRAZOLAM 0.5 MG
0.5 TABLET ORAL 3 TIMES DAILY PRN
Qty: 20 TABLET | Refills: 1 | Status: SHIPPED | OUTPATIENT
Start: 2022-11-09 | End: 2024-09-09

## 2022-11-09 RX ORDER — BUTALBITAL, ACETAMINOPHEN AND CAFFEINE 50; 325; 40 MG/1; MG/1; MG/1
1-2 TABLET ORAL EVERY 6 HOURS PRN
Qty: 30 TABLET | Refills: 2 | Status: SHIPPED | OUTPATIENT
Start: 2022-11-09

## 2022-11-09 NOTE — PROGRESS NOTES
Gaviota is a 47 year old who is being evaluated via a billable video visit.      How would you like to obtain your AVS? Mail a copy  If the video visit is dropped, the invitation should be resent by: Text to cell phone: [phone]  Will anyone else be joining your video visit? No    Assessment & Plan   Problem List Items Addressed This Visit        Nervous and Auditory    Migraine Headache    Relevant Medications    butalbital-acetaminophen-caffeine (ESGIC) -40 MG tablet   Other Visit Diagnoses     Bilateral breast lump    -  Primary    Relevant Orders    MA Diagnostic Digital Bilateral    US Breast Bilateral Complete 4 Quadrants    Anxiety        Relevant Medications    ALPRAZolam (XANAX) 0.5 MG tablet         Patient with fibrocystic breast changes complaining of new onset painful lumps in bilateral breasts, left greater than right.  I will order the above imaging along with procedure for aspiration if indicated per radiologist evaluation. Low suspicion for breast abscess, cellulitis, mastitis or breast cancer at this point. I will refill her Esgic and Xanax. Has had headaches similar to previous. No red flag symptoms. Anxiety is under control with sparse situational use of Xanax. Prescribers database reviewed.  No side effects of the medication.      DDx and Dx discussed with and explained to the pt to their satisfaction.  All questions were answered at this time. Pt expressed understanding of and agreement with this dx, tx, and plan. No further workup warranted and standard medication warnings given. I have given the patient a list of pertinent indications for re-evaluation. Will go to the Emergency Department if symptoms worsen or new concerning symptoms arise. Patient left the call in no apparent distress.     Prescription drug management     See Patient Instructions    Return for a recheck as needed, or call 911/go to an ER anytime if worsening.    FARZANA Mix  [facility]  GONZALO Meek is a 47 year old presenting for the following health issues:  Anxiety, Recheck Medication, and Referral      HPI     Anxiety Follow-Up    How are you doing with your anxiety since your last visit? No change    Are you having other symptoms that might be associated with anxiety? No    Have you had a significant life event? No     Are you feeling depressed? No    Do you have any concerns with your use of alcohol or other drugs? No    Social History     Tobacco Use     Smoking status: Never     Smokeless tobacco: Never   Vaping Use     Vaping Use: Never used   Substance Use Topics     Alcohol use: Yes     Drug use: Not Currently       How many servings of fruits and vegetables do you eat daily?  2-3    On average, how many sweetened beverages do you drink each day (Examples: soda, juice, sweet tea, etc.  Do NOT count diet or artificially sweetened beverages)?   1    How many days per week do you exercise enough to make your heart beat faster? 4    How many minutes a day do you exercise enough to make your heart beat faster? 30 - 60    How many days per week do you miss taking your medication? 0    Medication Followup of Butalbital    Taking Medication as prescribed: yes    Side Effects:  None    Medication Helping Symptoms:  Yes    Patient requests referral for lump in breast. Hx of simple cyst that was aspirated. Bilateral lumps, left>right for the last month. No personal history of breast CA. No overlying signs of infection or nipple drainage.    Review of Systems   Constitutional, HEENT, cardiovascular, pulmonary, gi and gu systems are negative, except as otherwise noted.      Objective           Vitals:  No vitals were obtained today due to virtual visit.    Physical Exam   GENERAL: Healthy, alert and no distress  EYES: Eyes grossly normal to inspection.  No discharge or erythema, or obvious scleral/conjunctival abnormalities.  RESP: No audible wheeze, cough, or visible cyanosis.  No  visible retractions or increased work of breathing.    SKIN: Visible skin clear. No significant rash, abnormal pigmentation or lesions.  NEURO: Cranial nerves grossly intact.  Mentation and speech appropriate for age.  PSYCH: Mentation appears normal, affect normal/bright, judgement and insight intact, normal speech and appearance well-groomed.     Video-Visit Details    Video Start Time: 11:28 AM    Type of service:  Video Visit    Video End Time:    Originating Location (pt. Location): Home    Distant Location (provider location):  On-site    Platform used for Video Visit: [place]

## 2022-11-09 NOTE — PATIENT INSTRUCTIONS
Mauricio Meek,    Thank you for allowing [facility] to manage your care.    I am unsure of the cause of your symptoms, but we will see what our workup shows.     If you develop worsening/changing symptoms at any time, please call 911 or go to the emergency department for evaluation.    I sent your prescriptions to your pharmacy.    I ordered a diagnostic mammogram and ultrasound of both breasts. Please call diagnostic imaging [phone] to schedule your test.    Please allow 1-2 business days for our office to contact you in regards to your laboratory/radiological studies.  If not done so, I encourage you to login into [facility] ([email or web address] to review your results as well.     If you have any questions or concerns, please feel free to call us at [phone]    Sincerely,    Сергей Taylor PA-C    Did you know?      You can schedule a video visit for follow-up appointments as well as future appointments for certain conditions.  Please see the below link.     [email or web address]    If you have not already done so,  I encourage you to sign up for [facility] ([email or web address]).  This will allow you to review your results, securely communicate with a provider, and schedule virtual visits as well.

## 2022-11-21 ENCOUNTER — HOSPITAL ENCOUNTER (OUTPATIENT)
Dept: MAMMOGRAPHY | Facility: CLINIC | Age: 47
Discharge: HOME | End: 2022-11-21
Attending: PHYSICIAN ASSISTANT
Payer: COMMERCIAL

## 2022-11-21 DIAGNOSIS — N63.10 BILATERAL BREAST LUMP: ICD-10-CM

## 2022-11-21 DIAGNOSIS — N63.20 BILATERAL BREAST LUMP: ICD-10-CM

## 2022-11-21 PROCEDURE — 77066 DX MAMMO INCL CAD BI: CPT

## 2022-11-21 PROCEDURE — 250N000009 HC RX 250: Performed by: PHYSICIAN ASSISTANT

## 2022-11-21 PROCEDURE — 76642 ULTRASOUND BREAST LIMITED: CPT | Mod: LT

## 2022-11-21 PROCEDURE — 19000 PUNCTURE ASPIR CYST BREAST: CPT | Mod: LT

## 2022-11-21 RX ADMIN — LIDOCAINE HYDROCHLORIDE 10 ML: 10 INJECTION, SOLUTION EPIDURAL; INFILTRATION; INTRACAUDAL; PERINEURAL at 11:03

## 2023-03-27 ENCOUNTER — TELEPHONE (OUTPATIENT)
Dept: FAMILY MEDICINE | Facility: CLINIC | Age: 48
End: 2023-03-27
Payer: COMMERCIAL

## 2023-03-27 DIAGNOSIS — R51.9 NONINTRACTABLE EPISODIC HEADACHE, UNSPECIFIED HEADACHE TYPE: Primary | ICD-10-CM

## 2023-03-27 RX ORDER — BUTALBITAL, ASPIRIN, AND CAFFEINE 325; 50; 40 MG/1; MG/1; MG/1
CAPSULE ORAL
Qty: 30 CAPSULE | Refills: 0 | Status: SHIPPED | OUTPATIENT
Start: 2023-03-27 | End: 2024-09-09

## 2023-03-27 NOTE — TELEPHONE ENCOUNTER
Patient calling to request refill on butalbital-aspirin-caffeine.    Patient states seeing Dio Taylor who last filled her medications this past November since PCP(Rachel Rojo) moved.    Patient likes to alternate between butalbital-tylenol and butalbital-aspirin for her migraines and states Сергей filled the butalbital-tylenol-caffeine but needs the one with aspirin as well.    Patient lives in [place] but hasn't re-established care with another provider yet with [facility]; doesn't have good insurance at this time and hoping Dio Talyor can send in this Rx.     Sb'd below if approved    Renuka Pineda, RN

## 2023-11-14 ENCOUNTER — TELEPHONE (OUTPATIENT)
Dept: NURSING | Facility: CLINIC | Age: 48
End: 2023-11-14
Payer: COMMERCIAL

## 2023-11-14 NOTE — TELEPHONE ENCOUNTER
"Pt requesting refill of:      Disp Refills Start End NIEVES    butalbital-aspirin-caffeine (FIORINAL) -40 MG capsule 30 capsule 0 3/27/2023  No   Sig: TAKE 1 TO 2 CAPSULES BY MOUTH EVERY 6 HOURS AS NEEDED FOR PAIN Strength: -40 mg. Need appointment for further refills.     FNA recommended visit for refills.  Pt declines a visit/annual physical as her health insurance is \"not good.\" States she is trying to get by this year and will consider scheduling follow up in 2024.  FNA advised her previous PCP has left practice at [facility] in July 2022. Pt last OV was 5/4/21. Virtual visit done with Brandon Hylton) on 11/9/22.     Aimee Guido RN/[place] Nurse Advisor    "

## 2023-11-15 NOTE — TELEPHONE ENCOUNTER
Patient needs an appointment for refill of this med. She could do an e-visit, virtual visit or in person. Please update.

## 2024-09-06 ENCOUNTER — TELEPHONE (OUTPATIENT)
Dept: FAMILY MEDICINE | Facility: CLINIC | Age: 49
End: 2024-09-06

## 2024-09-06 NOTE — TELEPHONE ENCOUNTER
Outreach to patient regarding appt 9/16/24.  Pt can't establish care with Dr Solis. If she would still like to come to her appointment, she is welcome to. Is this a physical of office visit/med check?

## 2024-09-09 ENCOUNTER — OFFICE VISIT (OUTPATIENT)
Dept: FAMILY MEDICINE | Facility: CLINIC | Age: 49
End: 2024-09-09
Payer: COMMERCIAL

## 2024-09-09 VITALS
RESPIRATION RATE: 16 BRPM | SYSTOLIC BLOOD PRESSURE: 123 MMHG | DIASTOLIC BLOOD PRESSURE: 78 MMHG | WEIGHT: 136 LBS | HEIGHT: 64 IN | HEART RATE: 98 BPM | TEMPERATURE: 99 F | BODY MASS INDEX: 23.22 KG/M2

## 2024-09-09 DIAGNOSIS — G43.809 OTHER MIGRAINE WITHOUT STATUS MIGRAINOSUS, NOT INTRACTABLE: Primary | ICD-10-CM

## 2024-09-09 DIAGNOSIS — N95.1 PERIMENOPAUSAL: ICD-10-CM

## 2024-09-09 DIAGNOSIS — Z79.899 CONTROLLED SUBSTANCE AGREEMENT SIGNED: ICD-10-CM

## 2024-09-09 DIAGNOSIS — F40.243 PHOBIA, FLYING: ICD-10-CM

## 2024-09-09 PROCEDURE — 99214 OFFICE O/P EST MOD 30 MIN: CPT | Performed by: FAMILY MEDICINE

## 2024-09-09 PROCEDURE — G2211 COMPLEX E/M VISIT ADD ON: HCPCS | Performed by: FAMILY MEDICINE

## 2024-09-09 RX ORDER — BUTALBITAL, ACETAMINOPHEN AND CAFFEINE 50; 325; 40 MG/1; MG/1; MG/1
1 TABLET ORAL EVERY 4 HOURS PRN
Qty: 30 TABLET | Refills: 0 | Status: SHIPPED | OUTPATIENT
Start: 2024-09-09

## 2024-09-09 RX ORDER — NICOTINE 14MG/24HR
PATCH, TRANSDERMAL 24 HOURS TRANSDERMAL
COMMUNITY

## 2024-09-09 RX ORDER — BUTALBITAL, ASPIRIN, AND CAFFEINE 325; 50; 40 MG/1; MG/1; MG/1
CAPSULE ORAL
Qty: 30 CAPSULE | Refills: 0 | Status: SHIPPED | OUTPATIENT
Start: 2024-09-09

## 2024-09-09 RX ORDER — LORAZEPAM 0.5 MG/1
0.5 TABLET ORAL EVERY 6 HOURS PRN
Qty: 10 TABLET | Refills: 0 | Status: SHIPPED | OUTPATIENT
Start: 2024-09-09

## 2024-09-09 ASSESSMENT — PAIN SCALES - GENERAL: PAINLEVEL: NO PAIN (0)
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
## 2024-09-09 NOTE — LETTER
[facility]  09/09/24  Patient: Gaviota Granado  YOB: 1975  Medical Record Number: [id number]                                                                                  Non-Opioid Controlled Substance Agreement    This is an agreement between you and your provider regarding safe and appropriate use of controlled substances prescribed by your care team. Controlled substances are?medicines that can cause physical and mental dependence (abuse).     There are strict laws about having and using these medicines. We here at [facility] are  committed to working with you in your efforts to get better. To support you in this work, we'll help you schedule regular office appointments for medicine refills. If we must cancel or change your appointment for any reason, we'll make sure you have enough medicine to last until your next appointment.     As a Provider, I will:   Listen carefully to your concerns while treating you with respect.   Recommend a treatment plan that I believe is in your best interest and may involve therapies other than medicine.    Talk with you often about the possible benefits and the risk of harm of any medicine that we prescribe for you.  Assess the safety of this medicine and check how well it works.    Provide a plan on how to taper (discontinue or go off) using this medicine if the decision is made to stop its use.      ::  As a Patient, I understand controlled substances:     Are prescribed by my care provider to help me function or work and manage my condition(s).?  Are strong medicines and can cause serious side effects.     Need to be taken exactly as prescribed.?Combining controlled substances with certain medicines or chemicals (such as illegal drugs, alcohol, sedatives, sleeping pills, and benzodiazepines) can be dangerous or even fatal.? If I stop taking my medicines suddenly, I may have severe withdrawal symptoms.     The risks, benefits,  and side effects of these medicine(s) were explained to me. I agree that:    I will take part in other treatments as advised by my care team. This may be psychiatry or counseling, physical therapy, behavioral therapy, group treatment or a referral to specialist.    I will keep all my appointments and understand this is part of the monitoring of controlled substances.?My care team may require an office visit for EVERY controlled substance refill. If I miss appointments or don t follow instructions, my care team may stop my medicine    I will take my medicines as prescribed. I will not change the dose or schedule unless my care team tells me to. There will be no refills if I run out early.      I may be asked to come to the clinic and complete a urine drug test or complete a pill count. If I don t give a urine sample or participate in a pill count, the care team may stop my medicine.    I will only receive controlled substance prescriptions from this clinic. If I am treated by another provider, I will tell them that I am taking controlled substances and that I have a treatment agreement with this provider. I will inform my [facility] care team within one business day if I am given a prescription for any controlled substance by another healthcare provider. My [facility] care team can contact other providers and pharmacists about my use of any medicines.    It is up to me to make sure that I don't run out of my medicines on weekends or holidays.?If my care team is willing to refill my prescription without a visit, I must request refills only during office hours. Refills may take up to 3 business days to process. I will use one pharmacy to fill all my controlled substance prescriptions. I will notify the clinic about any changes to my insurance or medicine availability.    I am responsible for my prescriptions. If the medicine/prescription is lost, stolen or destroyed, it will not be replaced.?I also agree  not to share controlled substance medicines with anyone.     I am aware I should not use any illegal or recreational drugs. I agree not to drink alcohol unless my care team says I can.     If I enroll in the [place] Medical Cannabis program, I will tell my care team before my next refill.    I will tell my care team right away if I become pregnant, have a new medical problem treated outside of my regular clinic, or have a change in my medicines.     I understand that this medicine can affect my thinking, judgment and reaction time.? Alcohol and drugs affect the brain and body, which can affect the safety of my driving. Being under the influence of alcohol or drugs can affect my decision-making, behaviors, personal safety and the safety of others. Driving while impaired (DWI) can occur if a person is driving, operating or in physical control of a car, motorcycle, boat, snowmobile, ATV, motorbike, off-road vehicle or any other motor vehicle ([place] Statute 169A.20). I understand the risk if I choose to drive or operate any vehicle or machinery.    I understand that if I do not follow any of the conditions above, my prescriptions or treatment may be stopped or changed.   I agree that my provider, clinic care team and pharmacy may work with any city, state or federal law enforcement agency that investigates the misuse, sale or other diversion of my controlled medicine. I will allow my provider to discuss my care with, or share a copy of, this agreement with any other treating provider, pharmacy or emergency room where I receive care.     I have read this agreement and have asked questions about anything I did not understand.    ________________________________________________________  Patient Signature - Gaviota Granado     ___________________                   Date     ________________________________________________________  Provider Signature - Francheska Solis MD       ___________________                   Date      ________________________________________________________  Witness Signature (required if provider not present while patient signing)          ___________________                   Date

## 2024-10-28 ENCOUNTER — NURSE TRIAGE (OUTPATIENT)
Dept: FAMILY MEDICINE | Facility: CLINIC | Age: 49
End: 2024-10-28
Payer: COMMERCIAL

## 2024-10-28 NOTE — TELEPHONE ENCOUNTER
Provider Response to 2nd Level Triage Request    I have reviewed the RN documentation. My recommendation is:  Needs to be seen in clinic

## 2024-10-28 NOTE — TELEPHONE ENCOUNTER
Order/Referral Request    Who is requesting: patient    Orders being requested: Image of lump in breast    Reason service is needed/diagnosis: Lump in breast after mammogram    When are orders needed by: ASAP    Has this been discussed with Provider: Yes    Does patient have a preference on a Group/Provider/Facility? [facility]    Does patient have an appointment scheduled?: No    Where to send orders: Place orders within Epic    Okay to leave a detailed message?: Yes at Cell number on file:    Telephone Information:   Mobile [phone]

## 2024-10-28 NOTE — TELEPHONE ENCOUNTER
Spoke to patient and relayed message from provider.  Patient verbalized understanding and agrees with plan. She may call to see if she can get her mammogram since it is due for her yearly one.      ADILSON Pollard RN  [facility]

## 2024-10-28 NOTE — TELEPHONE ENCOUNTER
Left message for patient to return call. If patient calls back, please route to [facility].     TC please route to RN.    Please assist in scheduling in clinic appointment per provider.     Temi Alicea RN  [facility]

## 2024-10-28 NOTE — TELEPHONE ENCOUNTER
Patient states that previously when had mammograms she was told by the imaging people to ask for mammogram and ultrasound to be ordered at the same time due to her history of cysts that need draining.  She noticed this cyst a week ago and had irregular period last month, so thinks this occurred in response to that.    She states that this is the same that is occurring as previously and is requesting these orders to be placed because of insurance reasons does not want to have an office visit first.    Please advise.      She states it is okay to leave a message on her voice mail with response.      Abbie Perez, SARAHN RN  [facility]

## 2024-10-28 NOTE — TELEPHONE ENCOUNTER
Abbie Perez, WAQAR called Gaviota on 10/28 and left a message to return the call to the clinic.  If patient calls back, please route to [facility].    TC please route to RN.    ADILSON Pollard RN  [facility]

## 2024-10-30 ENCOUNTER — TELEPHONE (OUTPATIENT)
Dept: FAMILY MEDICINE | Facility: CLINIC | Age: 49
End: 2024-10-30
Payer: COMMERCIAL

## 2024-10-30 NOTE — TELEPHONE ENCOUNTER
Reason for Call:  Appointment Request    Patient requesting this type of appt:  left breast lump     Requested provider: Francheska Solis     Reason patient unable to be scheduled: Not within requested timeframe    When does patient want to be seen/preferred time:  As soon as possible     Comments: Gaviota is requesting to see you to get a order for a diagnostic mammogram for a left breast lump. States has history of cysts that have been drained.     Okay to leave a detailed message?: Yes at Home number on file [phone] (home)    Call taken on 10/30/2024 at 2:29 PM by Peyton Gonzalez

## 2024-10-31 ENCOUNTER — OFFICE VISIT (OUTPATIENT)
Dept: FAMILY MEDICINE | Facility: CLINIC | Age: 49
End: 2024-10-31
Payer: COMMERCIAL

## 2024-10-31 VITALS
HEART RATE: 74 BPM | SYSTOLIC BLOOD PRESSURE: 135 MMHG | BODY MASS INDEX: 23.9 KG/M2 | HEIGHT: 64 IN | DIASTOLIC BLOOD PRESSURE: 83 MMHG | TEMPERATURE: 97.6 F | OXYGEN SATURATION: 99 % | WEIGHT: 140 LBS | RESPIRATION RATE: 16 BRPM

## 2024-10-31 DIAGNOSIS — Z12.11 SCREENING FOR COLON CANCER: ICD-10-CM

## 2024-10-31 DIAGNOSIS — N63.25 MASS OVERLAPPING MULTIPLE QUADRANTS OF LEFT BREAST: Primary | ICD-10-CM

## 2024-10-31 PROCEDURE — 99213 OFFICE O/P EST LOW 20 MIN: CPT | Performed by: NURSE PRACTITIONER

## 2024-10-31 NOTE — PROGRESS NOTES
"  Assessment & Plan     Mass overlapping multiple quadrants of left breast  Patient likely needs to have cyst aspirated again. I have order mammogram and ultrasound for breast aspiration-may need additional ultrasound order for regular ultrasound prior to the aspiration.      - US Breast Aspiration Cyst Initial Left; Future  - MA Diagnostic Bilateral w/ Efren; Future    Screening for colon cancer  Patient was told to have performed every 3 years. Patient last had one in 2021-I do see record of the exam being complted but I do no see the actual results-I will order screening.    - Colonoscopy Screening  Referral; Future                Subjective   Gaviota is a 49 year old, presenting for the following health issues:  Breast Mass (Left breast ; about size of golf ball ; noticed 2 weeks ago ; painful )        10/31/2024    11:11 AM   Additional Questions   Roomed by Anoop RODRIGUEZ     Patient has had previous instance of similar cyst and had required ultrasound guided drainage.                   Objective    /83 (BP Location: Right arm, Patient Position: Sitting, Cuff Size: Adult Regular)   Pulse 74   Temp 97.6  F (36.4  C) (Temporal)   Resp 16   Ht 1.626 m (5' 4\")   Wt 63.5 kg (140 lb)   LMP 08/30/2024 (Approximate)   SpO2 99%   BMI 24.03 kg/m    Body mass index is 24.03 kg/m .  Physical Exam  Constitutional:       Appearance: Normal appearance.   Chest:   Breasts:     Left: Mass (below breast-lower quadrant-about 6 oclock) and tenderness present. No swelling or nipple discharge.   Neurological:      General: No focal deficit present.      Mental Status: She is alert and oriented to person, place, and time.   Psychiatric:         Mood and Affect: Mood normal.         Behavior: Behavior normal.                    Signed Electronically by: JOSELYN DURAN CNP    "

## 2024-11-05 ENCOUNTER — HOSPITAL ENCOUNTER (OUTPATIENT)
Dept: MAMMOGRAPHY | Facility: CLINIC | Age: 49
Discharge: HOME | End: 2024-11-05
Attending: NURSE PRACTITIONER
Payer: COMMERCIAL

## 2024-11-05 DIAGNOSIS — N63.25 MASS OVERLAPPING MULTIPLE QUADRANTS OF LEFT BREAST: ICD-10-CM

## 2024-11-05 PROCEDURE — 76642 ULTRASOUND BREAST LIMITED: CPT | Mod: 50

## 2024-11-05 PROCEDURE — 77066 DX MAMMO INCL CAD BI: CPT

## 2024-11-05 PROCEDURE — 272N000431 US BREAST ASPIRATION CYST INITIAL LEFT

## 2024-11-05 PROCEDURE — 250N000009 HC RX 250: Performed by: NURSE PRACTITIONER

## 2024-11-05 RX ADMIN — LIDOCAINE HYDROCHLORIDE 10 ML: 10 INJECTION, SOLUTION EPIDURAL; INFILTRATION; INTRACAUDAL; PERINEURAL at 14:27

## 2024-11-05 NOTE — PROGRESS NOTES
Radiologist, Dr Peyton Long, recommends stereotactic guided left breast biopsy.     While pt was at [facility], I scheduled pt at [facility], [address] , [place] , [place], [place]. [phone]. Appt: Tues 11/12/24, arrival at 12:45pm for 1:00pm appt.      I reviewed the procedure and the written pre and post breast biopsy patient handouts with patient, and I gave patient the written pre and post biopsy patient handouts to take home.     Pt verbalizes understanding of procedure and appt location, date, and time. Calls welcomed.    Agustina Freitas, RN, BSN, CBCN  [facility]

## 2024-11-05 NOTE — LETTER
Gaviota Vannesa  [address] [place] [id number]            November 5, 2024  Date of Exam: 11/5/2024    Dear Gaviota:    Thank you for your recent visit.    Breast Imaging Result: Based on your recent breast imaging, you have a suspicious area that usually requires a biopsy, at which time a small tissue sample would be taken from your breast.      Your breast tissue is dense:  Breast tissue can be either dense or not dense. Dense tissue makes it harder to find breast cancer on a mammogram and also raises the risk of developing breast cancer. Your breast tissue is dense. In some people with dense tissue, other imaging tests in addition to a mammogram may help find cancers. Talk to your healthcare provider about breast density, risks for breast cancer, and your individual situation.    If you have already made these arrangements, please disregard this letter.    A report of your breast imaging results was sent to: Tessie Hednrix    Your breast imaging will become part of your medical file here at [facility] for at least 10 years. You are responsible for informing any new health care team or breast imaging facility of the date and location of this examination.    We appreciate the opportunity to participate in your health care.    Sincerely,  Peyton Long MD   [facility]

## 2024-11-12 ENCOUNTER — HOSPITAL ENCOUNTER (OUTPATIENT)
Dept: MAMMOGRAPHY | Facility: CLINIC | Age: 49
Discharge: HOME | End: 2024-11-12
Attending: NURSE PRACTITIONER
Payer: COMMERCIAL

## 2024-11-12 DIAGNOSIS — N63.25 MASS OVERLAPPING MULTIPLE QUADRANTS OF LEFT BREAST: ICD-10-CM

## 2024-11-12 PROCEDURE — 272N000715 MA STEREOTACTIC BREAST BIOPSY VACUUM LT

## 2024-11-12 PROCEDURE — 250N000009 HC RX 250: Performed by: NURSE PRACTITIONER

## 2024-11-12 PROCEDURE — 250N000011 HC RX IP 250 OP 636: Performed by: NURSE PRACTITIONER

## 2024-11-12 RX ORDER — LIDOCAINE HYDROCHLORIDE AND EPINEPHRINE 10; 10 MG/ML; UG/ML
10 INJECTION, SOLUTION INFILTRATION; PERINEURAL ONCE
Status: COMPLETED | OUTPATIENT
Start: 2024-11-12 | End: 2024-11-12

## 2024-11-12 RX ADMIN — LIDOCAINE HYDROCHLORIDE 10 ML: 10 SOLUTION INTRAVENOUS at 13:21

## 2024-11-12 RX ADMIN — LIDOCAINE HYDROCHLORIDE,EPINEPHRINE BITARTRATE 10 ML: 10; .01 INJECTION, SOLUTION INFILTRATION; PERINEURAL at 13:21

## 2024-11-12 NOTE — PROGRESS NOTES
"Gaviota arrived at [facility]. I escorted pt to the Breast Center consult room. Pt was identified using full name and . Wristband is on pt wrist. Pt was able to state which procedure and correct side breast biopsy was occurring today. Pt is very anxious and stated she was still sore from aspiration done a week ago. I offered to reschedule today's procedure, but pt stated she \"just need to get this over with\". I reviewed the consent form with the pt and pt was given the consent form to review before signing.     I reviewed post breast biopsy care. Pt acknowledged understanding of post biopsy care. Pt was given written post breast biopsy care handout to take home.    Pt will discuss the clip marker with the Radiologist prior to start of procedure, as she is planning to decline the placement of the clip.     I explained results are expected in 3-5 business days and Breast Center RN will call pt at number pt provided today. Pt does not have active MyChart. Pt verbalizes understanding.    Pt's questions were answered to the best of my ability.    I escorted pt to the changing room and pt changed into gown. Pt placed personal belongings in a locker and pt has the pak. I escorted pt to Mammo room 3 and pt sat on procedure chair. Pt accepted a warm blanket. Mammo Tech, Hetal Gaona, was in the procedure room when pt and I arrived.    I stepped out of the room and I informed Dr Yon Hunter, Radiologist, that pt was very anxious, tender from breast aspiration, and that pt would like to discuss the clip marker prior to procedure start.     Agustina Freitas, RN, BSN, CBCN  [facility]   "

## 2024-11-12 NOTE — DISCHARGE INSTRUCTIONS
Please see Comment in Post-procedure section. Pt was given copy of the discharge instructions. Pt was advised to continue applying cool-packs to site and using pain relieving medications per instruction sheet. Pt was instructed to leave ace wrap on until at least bedtime tonight or sleep with it on one night and remove it in the morning and then wear sports bra. Calls welcomed.     Agustina Freitas RN, BSN, [facility]

## 2024-11-18 LAB
PATH REPORT.COMMENTS IMP SPEC: NORMAL
PATH REPORT.FINAL DX SPEC: NORMAL
PATH REPORT.GROSS SPEC: NORMAL
PATH REPORT.MICROSCOPIC SPEC OTHER STN: NORMAL
PATH REPORT.RELEVANT HX SPEC: NORMAL
PHOTO IMAGE: NORMAL

## 2024-11-19 ENCOUNTER — TELEPHONE (OUTPATIENT)
Dept: MAMMOGRAPHY | Facility: CLINIC | Age: 49
End: 2024-11-19
Payer: COMMERCIAL

## 2024-11-19 NOTE — Clinical Note
Hello,  Pt was notified of benign breast biopsy results and 6 mos follow up diagnostic  mammogram recommendation.

## 2024-11-19 NOTE — TELEPHONE ENCOUNTER
"Following Radiologist, Dr Abida Young's review of 11/12/24, left breast biopsy pathology and imaging, and at Dr. Abida Young's request, I telephoned patient to review the benign pathology results and Radiologist's recommendation to return in 6 months for follow-up diagnostic mammogram.    Pt states she is still \"tender\" in area of  cyst aspiration and biopsy site. I encouraged pt to continue to wear supportive bra, use OTC pain reliever, and use cool packs if desired.    Pt appreciative of call today and care received during biopsy.     Pt verbalizes understanding of information provided during this call and acceptance of plan. I welcomed calls if any questions or concerns.     I will CC this note to ordering provider, Tessie Hendrix APRN, CNP.    Agustina Freitas, RN, BSN, [facility]  [facility]         "

## 2024-12-12 ENCOUNTER — LAB REQUISITION (OUTPATIENT)
Dept: LAB | Facility: CLINIC | Age: 49
End: 2024-12-12
Payer: COMMERCIAL

## 2024-12-12 DIAGNOSIS — Z12.4 ENCOUNTER FOR SCREENING FOR MALIGNANT NEOPLASM OF CERVIX: ICD-10-CM

## 2024-12-12 DIAGNOSIS — Z13.220 ENCOUNTER FOR SCREENING FOR LIPOID DISORDERS: ICD-10-CM

## 2024-12-12 DIAGNOSIS — Z13.1 ENCOUNTER FOR SCREENING FOR DIABETES MELLITUS: ICD-10-CM

## 2024-12-12 DIAGNOSIS — Z13.29 ENCOUNTER FOR SCREENING FOR OTHER SUSPECTED ENDOCRINE DISORDER: ICD-10-CM

## 2024-12-12 DIAGNOSIS — Z11.3 ENCOUNTER FOR SCREENING FOR INFECTIONS WITH A PREDOMINANTLY SEXUAL MODE OF TRANSMISSION: ICD-10-CM

## 2024-12-12 PROCEDURE — G0124 SCREEN C/V THIN LAYER BY MD: HCPCS | Performed by: PATHOLOGY

## 2024-12-12 PROCEDURE — 87624 HPV HI-RISK TYP POOLED RSLT: CPT | Performed by: OBSTETRICS & GYNECOLOGY

## 2024-12-12 PROCEDURE — 87491 CHLMYD TRACH DNA AMP PROBE: CPT | Performed by: OBSTETRICS & GYNECOLOGY

## 2024-12-12 PROCEDURE — G0145 SCR C/V CYTO,THINLAYER,RESCR: HCPCS | Performed by: OBSTETRICS & GYNECOLOGY

## 2024-12-13 LAB
C TRACH DNA SPEC QL PROBE+SIG AMP: NEGATIVE
N GONORRHOEA DNA SPEC QL NAA+PROBE: NEGATIVE

## 2024-12-16 LAB
HPV HR 12 DNA CVX QL NAA+PROBE: POSITIVE
HPV16 DNA CVX QL NAA+PROBE: NEGATIVE
HPV18 DNA CVX QL NAA+PROBE: POSITIVE
HUMAN PAPILLOMA VIRUS FINAL DIAGNOSIS: ABNORMAL

## 2024-12-18 LAB
BKR AP ASSOCIATED HPV REPORT: ABNORMAL
BKR LAB AP GYN ADEQUACY: ABNORMAL
BKR LAB AP GYN INTERPRETATION: ABNORMAL
BKR LAB AP LMP: ABNORMAL
BKR LAB AP PREVIOUS ABNL DX: ABNORMAL
BKR LAB AP PREVIOUS ABNORMAL: ABNORMAL
PATH REPORT.COMMENTS IMP SPEC: ABNORMAL
PATH REPORT.COMMENTS IMP SPEC: ABNORMAL
PATH REPORT.RELEVANT HX SPEC: ABNORMAL

## 2025-03-31 ENCOUNTER — LAB REQUISITION (OUTPATIENT)
Dept: LAB | Facility: CLINIC | Age: 50
End: 2025-03-31

## 2025-03-31 DIAGNOSIS — Z11.3 ENCOUNTER FOR SCREENING FOR INFECTIONS WITH A PREDOMINANTLY SEXUAL MODE OF TRANSMISSION: ICD-10-CM

## 2025-03-31 DIAGNOSIS — Z12.4 ENCOUNTER FOR SCREENING FOR MALIGNANT NEOPLASM OF CERVIX: ICD-10-CM

## 2025-03-31 PROCEDURE — 87591 N.GONORRHOEAE DNA AMP PROB: CPT | Performed by: OBSTETRICS & GYNECOLOGY

## 2025-03-31 PROCEDURE — 87491 CHLMYD TRACH DNA AMP PROBE: CPT | Performed by: OBSTETRICS & GYNECOLOGY

## 2025-03-31 PROCEDURE — 87624 HPV HI-RISK TYP POOLED RSLT: CPT | Performed by: OBSTETRICS & GYNECOLOGY

## 2025-04-01 LAB
C TRACH DNA SPEC QL PROBE+SIG AMP: NEGATIVE
N GONORRHOEA DNA SPEC QL NAA+PROBE: NEGATIVE
SPECIMEN TYPE: NORMAL

## 2025-04-03 LAB
HPV HR 12 DNA CVX QL NAA+PROBE: POSITIVE
HPV16 DNA CVX QL NAA+PROBE: NEGATIVE
HPV18 DNA CVX QL NAA+PROBE: NEGATIVE
HUMAN PAPILLOMA VIRUS FINAL DIAGNOSIS: ABNORMAL

## 2025-06-10 DIAGNOSIS — G43.809 OTHER MIGRAINE WITHOUT STATUS MIGRAINOSUS, NOT INTRACTABLE: ICD-10-CM

## 2025-06-10 NOTE — TELEPHONE ENCOUNTER
Outgoing call to patient. She said that she thought she was establishing care with Dr. Solis on 9/9/2024. Patient wondering if medication can be refilled?     Tiffanie MCKENZIE RN          OV 9/9/24  Other migraine without status migrainosus, not intractable  A refill for the butalbital acetaminophen caffeine version was last prescribed in 11/9/2022.  She may need codeine in the future.  - butalbital-aspirin-caffeine (FIORINAL) -40 MG capsule  Dispense: 30 capsule; Refill: 0  - butalbital-acetaminophen-caffeine (ESGIC) -40 MG tablet  Dispense: 30 tablet; Refill: 0

## 2025-06-10 NOTE — TELEPHONE ENCOUNTER
Clinic RN: Please investigate patient's chart or contact patient if the information cannot be found because patient does not have PCP. Please contact patient to see if they have outside provider or if they would like to establish care so refill can be sent.

## 2025-06-12 RX ORDER — BUTALBITAL, ASPIRIN, AND CAFFEINE 325; 50; 40 MG/1; MG/1; MG/1
CAPSULE ORAL
Qty: 20 CAPSULE | Refills: 0 | Status: SHIPPED | OUTPATIENT
Start: 2025-06-12

## 2025-08-06 ENCOUNTER — NURSE TRIAGE (OUTPATIENT)
Dept: FAMILY MEDICINE | Facility: CLINIC | Age: 50
End: 2025-08-06

## 2025-08-06 ENCOUNTER — OFFICE VISIT (OUTPATIENT)
Dept: FAMILY MEDICINE | Facility: CLINIC | Age: 50
End: 2025-08-06
Payer: COMMERCIAL

## 2025-08-06 VITALS
OXYGEN SATURATION: 99 % | DIASTOLIC BLOOD PRESSURE: 70 MMHG | SYSTOLIC BLOOD PRESSURE: 114 MMHG | WEIGHT: 134 LBS | BODY MASS INDEX: 23 KG/M2 | HEART RATE: 84 BPM | TEMPERATURE: 97.5 F | RESPIRATION RATE: 14 BRPM

## 2025-08-06 DIAGNOSIS — H01.004 BLEPHARITIS OF LEFT UPPER EYELID, UNSPECIFIED TYPE: Primary | ICD-10-CM

## 2025-08-06 PROCEDURE — 3074F SYST BP LT 130 MM HG: CPT | Performed by: NURSE PRACTITIONER

## 2025-08-06 PROCEDURE — 99213 OFFICE O/P EST LOW 20 MIN: CPT | Performed by: NURSE PRACTITIONER

## 2025-08-06 PROCEDURE — 3078F DIAST BP <80 MM HG: CPT | Performed by: NURSE PRACTITIONER

## 2025-08-06 RX ORDER — ERYTHROMYCIN 5 MG/G
0.5 OINTMENT OPHTHALMIC 2 TIMES DAILY
Qty: 3.5 G | Refills: 0 | Status: SHIPPED | OUTPATIENT
Start: 2025-08-06

## 2025-08-06 ASSESSMENT — ENCOUNTER SYMPTOMS: EYE PAIN: 1

## 2025-08-07 ENCOUNTER — TELEPHONE (OUTPATIENT)
Dept: FAMILY MEDICINE | Facility: CLINIC | Age: 50
End: 2025-08-07
Payer: COMMERCIAL

## 2025-08-07 DIAGNOSIS — H01.004 BLEPHARITIS OF LEFT UPPER EYELID, UNSPECIFIED TYPE: Primary | ICD-10-CM

## 2025-08-07 RX ORDER — OFLOXACIN 3 MG/ML
1-2 SOLUTION/ DROPS OPHTHALMIC 4 TIMES DAILY
Qty: 10 ML | Refills: 0 | Status: SHIPPED | OUTPATIENT
Start: 2025-08-07